# Patient Record
Sex: FEMALE | Race: ASIAN | NOT HISPANIC OR LATINO | Employment: OTHER | URBAN - METROPOLITAN AREA
[De-identification: names, ages, dates, MRNs, and addresses within clinical notes are randomized per-mention and may not be internally consistent; named-entity substitution may affect disease eponyms.]

---

## 2023-04-26 PROBLEM — E78.00 HYPERCHOLESTEROLEMIA: Status: ACTIVE | Noted: 2023-04-26

## 2023-04-26 PROBLEM — R23.8 DUSKY DISCOLORATION OF SKIN: Status: ACTIVE | Noted: 2023-04-26

## 2023-04-26 PROBLEM — M79.10 MYALGIA: Status: ACTIVE | Noted: 2023-04-26

## 2023-04-26 PROBLEM — I73.00 RAYNAUD'S PHENOMENON: Status: ACTIVE | Noted: 2023-04-26

## 2023-04-26 PROBLEM — B02.9 HERPES ZOSTER WITHOUT COMPLICATION: Status: ACTIVE | Noted: 2023-04-26

## 2023-04-26 PROBLEM — M54.12 CERVICAL RADICULAR PAIN: Status: ACTIVE | Noted: 2023-04-26

## 2023-04-26 PROBLEM — R21 RASH OF BODY: Status: ACTIVE | Noted: 2023-04-26

## 2023-04-26 PROBLEM — N39.0 RECURRENT URINARY TRACT INFECTION: Status: ACTIVE | Noted: 2023-04-26

## 2023-04-26 PROBLEM — N84.1 CERVICAL POLYP: Status: ACTIVE | Noted: 2023-04-26

## 2023-04-26 PROBLEM — N89.8 VAGINAL IRRITATION: Status: ACTIVE | Noted: 2023-04-26

## 2023-04-26 PROBLEM — M54.50 LOWER BACK PAIN: Status: ACTIVE | Noted: 2023-04-26

## 2023-04-26 PROBLEM — R07.9 CHEST PAIN: Status: ACTIVE | Noted: 2023-04-26

## 2023-04-26 PROBLEM — R53.83 FATIGUE: Status: ACTIVE | Noted: 2023-04-26

## 2023-04-26 PROBLEM — K92.1 HEMATOCHEZIA: Status: ACTIVE | Noted: 2023-04-26

## 2023-04-26 PROBLEM — S89.90XA LEG INJURY: Status: ACTIVE | Noted: 2023-04-26

## 2023-04-26 PROBLEM — R05.3 CHRONIC COUGH: Status: ACTIVE | Noted: 2023-04-26

## 2023-04-26 PROBLEM — R30.0 DYSURIA: Status: ACTIVE | Noted: 2023-04-26

## 2023-04-26 PROBLEM — E11.9 DIABETES MELLITUS (MULTI): Status: ACTIVE | Noted: 2023-04-26

## 2023-04-26 PROBLEM — E03.9 HYPOTHYROIDISM: Status: ACTIVE | Noted: 2023-04-26

## 2023-04-26 PROBLEM — E55.9 MILD VITAMIN D DEFICIENCY: Status: ACTIVE | Noted: 2023-04-26

## 2023-04-26 PROBLEM — R06.02 SHORTNESS OF BREATH ON EXERTION: Status: ACTIVE | Noted: 2023-04-26

## 2023-04-26 PROBLEM — I20.89 CHRONIC STABLE ANGINA (CMS-HCC): Status: ACTIVE | Noted: 2023-04-26

## 2023-04-26 PROBLEM — I83.93 VARICOSE VEINS OF LEGS: Status: ACTIVE | Noted: 2023-04-26

## 2023-04-26 PROBLEM — M25.519 SHOULDER PAIN: Status: ACTIVE | Noted: 2023-04-26

## 2023-04-26 PROBLEM — M54.41 LOW BACK PAIN WITH RIGHT-SIDED SCIATICA: Status: ACTIVE | Noted: 2023-04-26

## 2023-04-26 RX ORDER — PITAVASTATIN CALCIUM 2.09 MG/1
1 TABLET, FILM COATED ORAL DAILY
COMMUNITY
Start: 2019-10-10 | End: 2023-04-27 | Stop reason: SDUPTHER

## 2023-04-26 RX ORDER — BLOOD SUGAR DIAGNOSTIC
STRIP MISCELLANEOUS
COMMUNITY
Start: 2020-12-21

## 2023-04-26 RX ORDER — METFORMIN HYDROCHLORIDE 1000 MG/1
1 TABLET ORAL 2 TIMES DAILY
COMMUNITY
Start: 2014-10-07 | End: 2023-10-11 | Stop reason: SDUPTHER

## 2023-04-26 RX ORDER — BLOOD-GLUCOSE METER
EACH MISCELLANEOUS
COMMUNITY
Start: 2015-04-13

## 2023-04-26 RX ORDER — LEVOTHYROXINE SODIUM 50 UG/1
1 TABLET ORAL DAILY
COMMUNITY
Start: 2013-03-18 | End: 2023-09-19 | Stop reason: SDUPTHER

## 2023-04-26 RX ORDER — NITROFURANTOIN 25; 75 MG/1; MG/1
1 CAPSULE ORAL 2 TIMES DAILY
COMMUNITY
Start: 2022-08-19 | End: 2023-10-30 | Stop reason: ALTCHOICE

## 2023-04-27 ENCOUNTER — LAB (OUTPATIENT)
Dept: LAB | Facility: LAB | Age: 73
End: 2023-04-27
Payer: MEDICARE

## 2023-04-27 ENCOUNTER — TELEPHONE (OUTPATIENT)
Dept: PRIMARY CARE | Facility: CLINIC | Age: 73
End: 2023-04-27

## 2023-04-27 ENCOUNTER — OFFICE VISIT (OUTPATIENT)
Dept: PRIMARY CARE | Facility: CLINIC | Age: 73
End: 2023-04-27
Payer: MEDICARE

## 2023-04-27 VITALS
SYSTOLIC BLOOD PRESSURE: 122 MMHG | RESPIRATION RATE: 14 BRPM | OXYGEN SATURATION: 99 % | BODY MASS INDEX: 23.18 KG/M2 | HEIGHT: 64 IN | HEART RATE: 57 BPM | WEIGHT: 135.8 LBS | DIASTOLIC BLOOD PRESSURE: 72 MMHG

## 2023-04-27 DIAGNOSIS — E11.9 TYPE 2 DIABETES MELLITUS WITHOUT COMPLICATION, WITHOUT LONG-TERM CURRENT USE OF INSULIN (MULTI): ICD-10-CM

## 2023-04-27 DIAGNOSIS — E55.9 MILD VITAMIN D DEFICIENCY: ICD-10-CM

## 2023-04-27 DIAGNOSIS — Z71.84 TRAVEL ADVICE ENCOUNTER: ICD-10-CM

## 2023-04-27 DIAGNOSIS — E78.00 HYPERCHOLESTEROLEMIA: ICD-10-CM

## 2023-04-27 DIAGNOSIS — Z00.00 MEDICARE ANNUAL WELLNESS VISIT, SUBSEQUENT: ICD-10-CM

## 2023-04-27 DIAGNOSIS — Z00.00 MEDICARE ANNUAL WELLNESS VISIT, SUBSEQUENT: Primary | ICD-10-CM

## 2023-04-27 DIAGNOSIS — Z12.31 ENCOUNTER FOR SCREENING MAMMOGRAM FOR BREAST CANCER: ICD-10-CM

## 2023-04-27 LAB
ALANINE AMINOTRANSFERASE (SGPT) (U/L) IN SER/PLAS: 11 U/L (ref 7–45)
ALBUMIN (G/DL) IN SER/PLAS: 4.2 G/DL (ref 3.4–5)
ALBUMIN (G/DL) IN SER/PLAS: 4.3 G/DL (ref 3.4–5)
ALKALINE PHOSPHATASE (U/L) IN SER/PLAS: 58 U/L (ref 33–136)
ANION GAP IN SER/PLAS: 11 MMOL/L (ref 10–20)
ANION GAP IN SER/PLAS: 11 MMOL/L (ref 10–20)
ASPARTATE AMINOTRANSFERASE (SGOT) (U/L) IN SER/PLAS: 16 U/L (ref 9–39)
BILIRUBIN TOTAL (MG/DL) IN SER/PLAS: 0.4 MG/DL (ref 0–1.2)
CALCIDIOL (25 OH VITAMIN D3) (NG/ML) IN SER/PLAS: 36 NG/ML
CALCIUM (MG/DL) IN SER/PLAS: 9.1 MG/DL (ref 8.6–10.3)
CALCIUM (MG/DL) IN SER/PLAS: 9.2 MG/DL (ref 8.6–10.3)
CARBON DIOXIDE, TOTAL (MMOL/L) IN SER/PLAS: 29 MMOL/L (ref 21–32)
CARBON DIOXIDE, TOTAL (MMOL/L) IN SER/PLAS: 30 MMOL/L (ref 21–32)
CHLORIDE (MMOL/L) IN SER/PLAS: 99 MMOL/L (ref 98–107)
CHLORIDE (MMOL/L) IN SER/PLAS: 99 MMOL/L (ref 98–107)
CHOLESTEROL (MG/DL) IN SER/PLAS: 151 MG/DL (ref 0–199)
CHOLESTEROL (MG/DL) IN SER/PLAS: 160 MG/DL (ref 0–199)
CHOLESTEROL IN HDL (MG/DL) IN SER/PLAS: 63 MG/DL
CHOLESTEROL IN HDL (MG/DL) IN SER/PLAS: 64.1 MG/DL
CHOLESTEROL/HDL RATIO: 2.4
CHOLESTEROL/HDL RATIO: 2.5
COBALAMIN (VITAMIN B12) (PG/ML) IN SER/PLAS: 189 PG/ML (ref 211–911)
CREATININE (MG/DL) IN SER/PLAS: 0.86 MG/DL (ref 0.5–1.05)
CREATININE (MG/DL) IN SER/PLAS: 0.87 MG/DL (ref 0.5–1.05)
ERYTHROCYTE DISTRIBUTION WIDTH (RATIO) BY AUTOMATED COUNT: 14 % (ref 11.5–14.5)
ERYTHROCYTE MEAN CORPUSCULAR HEMOGLOBIN CONCENTRATION (G/DL) BY AUTOMATED: 30.4 G/DL (ref 32–36)
ERYTHROCYTE MEAN CORPUSCULAR VOLUME (FL) BY AUTOMATED COUNT: 83 FL (ref 80–100)
ERYTHROCYTES (10*6/UL) IN BLOOD BY AUTOMATED COUNT: 4.74 X10E12/L (ref 4–5.2)
ESTIMATED AVERAGE GLUCOSE FOR HBA1C: 148 MG/DL
GFR FEMALE: 70 ML/MIN/1.73M2
GFR FEMALE: 71 ML/MIN/1.73M2
GLUCOSE (MG/DL) IN SER/PLAS: 120 MG/DL (ref 74–99)
GLUCOSE (MG/DL) IN SER/PLAS: 122 MG/DL (ref 74–99)
HEMATOCRIT (%) IN BLOOD BY AUTOMATED COUNT: 39.2 % (ref 36–46)
HEMOGLOBIN (G/DL) IN BLOOD: 11.9 G/DL (ref 12–16)
HEMOGLOBIN A1C/HEMOGLOBIN TOTAL IN BLOOD: 6.8 %
HEPATITIS C VIRUS AB PRESENCE IN SERUM: NONREACTIVE
LDL: 73 MG/DL (ref 0–99)
LDL: 80 MG/DL (ref 0–99)
LEUKOCYTES (10*3/UL) IN BLOOD BY AUTOMATED COUNT: 6.6 X10E9/L (ref 4.4–11.3)
PHOSPHATE (MG/DL) IN SER/PLAS: 4.5 MG/DL (ref 2.5–4.9)
PLATELETS (10*3/UL) IN BLOOD AUTOMATED COUNT: 277 X10E9/L (ref 150–450)
POC HEMOGLOBIN A1C: 6.6 % (ref 4.2–6.5)
POTASSIUM (MMOL/L) IN SER/PLAS: 4.3 MMOL/L (ref 3.5–5.3)
POTASSIUM (MMOL/L) IN SER/PLAS: 4.3 MMOL/L (ref 3.5–5.3)
PROTEIN TOTAL: 7.3 G/DL (ref 6.4–8.2)
SODIUM (MMOL/L) IN SER/PLAS: 135 MMOL/L (ref 136–145)
SODIUM (MMOL/L) IN SER/PLAS: 136 MMOL/L (ref 136–145)
THYROTROPIN (MIU/L) IN SER/PLAS BY DETECTION LIMIT <= 0.05 MIU/L: 3.02 MIU/L (ref 0.44–3.98)
THYROTROPIN (MIU/L) IN SER/PLAS BY DETECTION LIMIT <= 0.05 MIU/L: 3.05 MIU/L (ref 0.44–3.98)
THYROXINE (T4) FREE (NG/DL) IN SER/PLAS: 1.15 NG/DL (ref 0.61–1.12)
TRIGLYCERIDE (MG/DL) IN SER/PLAS: 75 MG/DL (ref 0–149)
TRIGLYCERIDE (MG/DL) IN SER/PLAS: 78 MG/DL (ref 0–149)
UREA NITROGEN (MG/DL) IN SER/PLAS: 21 MG/DL (ref 6–23)
UREA NITROGEN (MG/DL) IN SER/PLAS: 21 MG/DL (ref 6–23)
VLDL: 15 MG/DL (ref 0–40)
VLDL: 16 MG/DL (ref 0–40)

## 2023-04-27 PROCEDURE — 1159F MED LIST DOCD IN RCRD: CPT | Performed by: INTERNAL MEDICINE

## 2023-04-27 PROCEDURE — 84443 ASSAY THYROID STIM HORMONE: CPT

## 2023-04-27 PROCEDURE — 86803 HEPATITIS C AB TEST: CPT

## 2023-04-27 PROCEDURE — 85027 COMPLETE CBC AUTOMATED: CPT

## 2023-04-27 PROCEDURE — 83036 HEMOGLOBIN GLYCOSYLATED A1C: CPT | Performed by: INTERNAL MEDICINE

## 2023-04-27 PROCEDURE — 36415 COLL VENOUS BLD VENIPUNCTURE: CPT

## 2023-04-27 PROCEDURE — 80061 LIPID PANEL: CPT

## 2023-04-27 PROCEDURE — 3074F SYST BP LT 130 MM HG: CPT | Performed by: INTERNAL MEDICINE

## 2023-04-27 PROCEDURE — 1036F TOBACCO NON-USER: CPT | Performed by: INTERNAL MEDICINE

## 2023-04-27 PROCEDURE — 82607 VITAMIN B-12: CPT

## 2023-04-27 PROCEDURE — 82306 VITAMIN D 25 HYDROXY: CPT

## 2023-04-27 PROCEDURE — 1170F FXNL STATUS ASSESSED: CPT | Performed by: INTERNAL MEDICINE

## 2023-04-27 PROCEDURE — G0439 PPPS, SUBSEQ VISIT: HCPCS | Performed by: INTERNAL MEDICINE

## 2023-04-27 PROCEDURE — 3078F DIAST BP <80 MM HG: CPT | Performed by: INTERNAL MEDICINE

## 2023-04-27 PROCEDURE — 80053 COMPREHEN METABOLIC PANEL: CPT

## 2023-04-27 PROCEDURE — 3044F HG A1C LEVEL LT 7.0%: CPT | Performed by: INTERNAL MEDICINE

## 2023-04-27 RX ORDER — CIPROFLOXACIN 500 MG/1
500 TABLET ORAL 2 TIMES DAILY
Qty: 14 TABLET | Refills: 0 | Status: SHIPPED | OUTPATIENT
Start: 2023-04-27 | End: 2023-04-27 | Stop reason: SDUPTHER

## 2023-04-27 RX ORDER — PITAVASTATIN CALCIUM 2.09 MG/1
1 TABLET, FILM COATED ORAL DAILY
Qty: 90 TABLET | Refills: 3 | Status: SHIPPED | OUTPATIENT
Start: 2023-04-27 | End: 2024-05-30 | Stop reason: ALTCHOICE

## 2023-04-27 RX ORDER — DIPHENOXYLATE HYDROCHLORIDE AND ATROPINE SULFATE 2.5; .025 MG/1; MG/1
1 TABLET ORAL 4 TIMES DAILY PRN
Qty: 24 TABLET | Refills: 0 | Status: SHIPPED | OUTPATIENT
Start: 2023-04-27 | End: 2023-05-02 | Stop reason: SDUPTHER

## 2023-04-27 RX ORDER — CIPROFLOXACIN 500 MG/1
500 TABLET ORAL 2 TIMES DAILY
Qty: 20 TABLET | Refills: 0 | Status: SHIPPED | OUTPATIENT
Start: 2023-04-27 | End: 2023-05-02 | Stop reason: SDUPTHER

## 2023-04-27 RX ORDER — DIPHENOXYLATE HYDROCHLORIDE AND ATROPINE SULFATE 2.5; .025 MG/1; MG/1
1 TABLET ORAL 4 TIMES DAILY PRN
Qty: 24 TABLET | Refills: 0 | Status: SHIPPED | OUTPATIENT
Start: 2023-04-27 | End: 2023-04-27 | Stop reason: SDUPTHER

## 2023-04-27 ASSESSMENT — ACTIVITIES OF DAILY LIVING (ADL)
DRESSING: INDEPENDENT
GROCERY_SHOPPING: INDEPENDENT
MANAGING_FINANCES: INDEPENDENT
TAKING_MEDICATION: INDEPENDENT
DOING_HOUSEWORK: INDEPENDENT
BATHING: INDEPENDENT

## 2023-04-27 ASSESSMENT — PATIENT HEALTH QUESTIONNAIRE - PHQ9
2. FEELING DOWN, DEPRESSED OR HOPELESS: NOT AT ALL
SUM OF ALL RESPONSES TO PHQ9 QUESTIONS 1 AND 2: 0
1. LITTLE INTEREST OR PLEASURE IN DOING THINGS: NOT AT ALL

## 2023-04-27 NOTE — PATIENT INSTRUCTIONS
FASTING LABS ARE ORDERED FOR YOU    2.  MAMMOGRAM IS ORDERED FOR YOU    3.  SCRIPTS FOR LOMOTIL AND CIPRO ARE ORDERED FOR YOU TO TAKE TO [facility] FOR AS NEEDED FOR INFECTIOUS DIARRHEA    4.  BONE DENSITY RECOMMENDED FOR WHEN YOU COME BACK, AS IS CONSIDERATION FOR SHINGRIX IMMUNIZATION.    5.  PNEUMONIA IMUNIZATION IS RECOMMENDED UPON RETURN FROM TRIP    6.  FOLLOW UP 6 MONTHS

## 2023-04-27 NOTE — TELEPHONE ENCOUNTER
Pt asking for PRINTED scripts of the Cipro and Lomotil.  They are traveling to [place] and would like to have these orders incase they need to be filled.

## 2023-04-27 NOTE — PROGRESS NOTES
"Subjective   Reason for Visit: Sylvia Ramos is an 73 y.o. female here for a Medicare Wellness visit.   WANTS REFERRAL TO HAVE EARS CHECKED BECAUSE OF HER AGE   TRAVELING TO [place] WOULD LIKE RX FOR LOMOTIL AND A ATB     Past Medical, Surgical, and Family History reviewed and updated in chart.         HPI    GOING TO [place] IN [facility], THE [facility].      NOT HAVING A LOT OF TROUBLES  HAS HAD A PROBLEM OF RECURRENT UTI, BUT SINCE HAD A CERVICAL POLYP REMOVAL, NO MORE UIT SINCE LAST NOVEMBER    SOMETIMES HAVE BURNING.    PLANNED TO GET STARTED ON VAGINAL CREAM TO HELP VAGINAL DRYNESS, ETC.      FLU SHOTYES, COVID SHOTS YES, PNEUMOVAX IN 2016, NOT HAD SHINGRIX IMMUNIZATIONS DECLINE FOR NOW RECOMMENDED    COLONOSCOPY IN 2020, HIV NOT INDICATED, BONE DENSITY TEST 2020 NEW ONE RECOMMENDED          Patient Care Team:  Miguel Bruce MD as PCP - General  Miguel Bruce MD as PCP - [facility] Attributed Provider     Review of Systems   Constitutional:  Negative for chills, diaphoresis and fever.   Respiratory:  Negative for cough and shortness of breath.    Cardiovascular:  Negative for chest pain and leg swelling.   Gastrointestinal:  Negative for constipation, diarrhea, nausea and vomiting.   Musculoskeletal:  Negative for joint swelling and myalgias.       Objective   Vitals:  /72   Pulse 57   Resp 14   Ht 1.626 m (5' 4\")   Wt 61.6 kg (135 lb 12.8 oz) Comment: PER PT  SpO2 99%   BMI 23.31 kg/m²       Physical Exam  Vitals reviewed.   Constitutional:       General: She is not in acute distress.     Appearance: She is not ill-appearing.   Cardiovascular:      Rate and Rhythm: Normal rate and regular rhythm.      Pulses: Normal pulses.      Heart sounds:      No gallop.   Pulmonary:      Breath sounds: Normal breath sounds. No wheezing, rhonchi or rales.   Abdominal:      General: Abdomen is flat. Bowel sounds are normal.      Palpations: Abdomen is soft.      Tenderness: There is no " guarding or rebound.   Musculoskeletal:      Right lower leg: No edema.      Left lower leg: No edema.         Assessment/Plan   Problem List Items Addressed This Visit          Endocrine/Metabolic    Diabetes mellitus (CMS/HCC)    Relevant Orders    POCT glycosylated hemoglobin (Hb A1C) manually resulted    Mild vitamin D deficiency    Relevant Orders    Vitamin D, Total (Completed)       Other    Hypercholesterolemia    Relevant Medications    pitavastatin calcium (Livalo) 2 mg tablet    Other Relevant Orders    CBC (Completed)    Comprehensive Metabolic Panel (Completed)    TSH with reflex to Free T4 if abnormal (Completed)    Vitamin B12 (Completed)    Lipid Panel (Completed)     Other Visit Diagnoses       Medicare annual wellness visit, subsequent    -  Primary    Relevant Orders    Hepatitis C Antibody (Completed)    Travel advice encounter        Encounter for screening mammogram for breast cancer        Relevant Orders    BI mammo bilateral screening tomosynthesis          Patient Instructions    FASTING LABS ARE ORDERED FOR YOU    2.  MAMMOGRAM IS ORDERED FOR YOU    3.  SCRIPTS FOR LOMOTIL AND CIPRO ARE ORDERED FOR YOU TO TAKE TO [facility] FOR AS NEEDED FOR INFECTIOUS DIARRHEA    4.  BONE DENSITY RECOMMENDED FOR WHEN YOU COME BACK, AS IS CONSIDERATION FOR SHINGRIX IMMUNIZATION.    5.  PNEUMONIA IMUNIZATION IS RECOMMENDED UPON RETURN FROM TRIP    6.  FOLLOW UP 6 MONTHS

## 2023-04-28 ENCOUNTER — TELEPHONE (OUTPATIENT)
Dept: PRIMARY CARE | Facility: CLINIC | Age: 73
End: 2023-04-28
Payer: MEDICARE

## 2023-04-28 NOTE — TELEPHONE ENCOUNTER
Please send the scripts for Patient and her  that were printed.  They have decided to fill locally, but the scripts will need to be sent electronically to   [facility].

## 2023-05-01 ASSESSMENT — ENCOUNTER SYMPTOMS
DIAPHORESIS: 0
DIARRHEA: 0
SHORTNESS OF BREATH: 0
JOINT SWELLING: 0
CHILLS: 0
COUGH: 0
VOMITING: 0
CONSTIPATION: 0
MYALGIAS: 0
FEVER: 0
NAUSEA: 0

## 2023-05-02 RX ORDER — CIPROFLOXACIN 500 MG/1
500 TABLET ORAL 2 TIMES DAILY
Qty: 20 TABLET | Refills: 0 | Status: SHIPPED | OUTPATIENT
Start: 2023-05-02 | End: 2023-05-12

## 2023-05-02 RX ORDER — DIPHENOXYLATE HYDROCHLORIDE AND ATROPINE SULFATE 2.5; .025 MG/1; MG/1
1 TABLET ORAL 4 TIMES DAILY PRN
Qty: 24 TABLET | Refills: 0 | Status: SHIPPED | OUTPATIENT
Start: 2023-05-02 | End: 2023-05-08

## 2023-09-18 ENCOUNTER — TELEPHONE (OUTPATIENT)
Dept: PRIMARY CARE | Facility: CLINIC | Age: 73
End: 2023-09-18
Payer: MEDICARE

## 2023-09-18 DIAGNOSIS — E03.9 HYPOTHYROIDISM, UNSPECIFIED TYPE: ICD-10-CM

## 2023-09-19 DIAGNOSIS — E03.9 HYPOTHYROIDISM, UNSPECIFIED TYPE: ICD-10-CM

## 2023-09-19 RX ORDER — LEVOTHYROXINE SODIUM 50 UG/1
50 TABLET ORAL DAILY
Qty: 90 TABLET | Refills: 1 | Status: SHIPPED | OUTPATIENT
Start: 2023-09-19 | End: 2023-10-28 | Stop reason: SDUPTHER

## 2023-09-19 RX ORDER — LEVOTHYROXINE SODIUM 50 UG/1
50 TABLET ORAL DAILY
Qty: 90 TABLET | Refills: 3 | Status: SHIPPED | OUTPATIENT
Start: 2023-09-19

## 2023-10-10 ENCOUNTER — TELEPHONE (OUTPATIENT)
Dept: PRIMARY CARE | Facility: CLINIC | Age: 73
End: 2023-10-10
Payer: MEDICARE

## 2023-10-11 DIAGNOSIS — E11.9 CONTROLLED TYPE 2 DIABETES MELLITUS WITHOUT COMPLICATION, WITHOUT LONG-TERM CURRENT USE OF INSULIN (MULTI): ICD-10-CM

## 2023-10-11 RX ORDER — METFORMIN HYDROCHLORIDE 1000 MG/1
1000 TABLET ORAL 2 TIMES DAILY
Qty: 180 TABLET | Refills: 0 | Status: SHIPPED | OUTPATIENT
Start: 2023-10-11 | End: 2024-01-11 | Stop reason: SDUPTHER

## 2023-10-24 ENCOUNTER — TELEPHONE (OUTPATIENT)
Dept: CARDIOLOGY | Facility: CLINIC | Age: 73
End: 2023-10-24
Payer: MEDICARE

## 2023-10-26 ENCOUNTER — APPOINTMENT (OUTPATIENT)
Dept: CARDIOLOGY | Facility: CLINIC | Age: 73
End: 2023-10-26
Payer: MEDICARE

## 2023-10-28 DIAGNOSIS — E53.8 VITAMIN B12 DEFICIENCY: Primary | ICD-10-CM

## 2023-10-28 PROBLEM — H01.02B SQUAMOUS BLEPHARITIS OF UPPER AND LOWER EYELIDS OF BOTH EYES: Status: ACTIVE | Noted: 2018-01-30

## 2023-10-28 PROBLEM — H25.013 CORTICAL SENILE CATARACT OF BOTH EYES: Status: ACTIVE | Noted: 2018-01-30

## 2023-10-28 PROBLEM — M54.50 LOWER BACK PAIN: Status: RESOLVED | Noted: 2023-04-26 | Resolved: 2023-10-28

## 2023-10-28 PROBLEM — D22.5 MELANOCYTIC NEVI OF TRUNK: Status: ACTIVE | Noted: 2019-12-09

## 2023-10-28 PROBLEM — B00.9 HERPESVIRAL INFECTION, UNSPECIFIED: Status: ACTIVE | Noted: 2019-12-09

## 2023-10-28 PROBLEM — G89.29 CHRONIC LEFT SHOULDER PAIN: Status: ACTIVE | Noted: 2018-02-06

## 2023-10-28 PROBLEM — H43.393 VITREOUS SYNERESIS OF BOTH EYES: Status: ACTIVE | Noted: 2018-01-30

## 2023-10-28 PROBLEM — E03.9 HYPOTHYROID: Status: ACTIVE | Noted: 2023-10-28

## 2023-10-28 PROBLEM — M25.512 CHRONIC LEFT SHOULDER PAIN: Status: ACTIVE | Noted: 2018-02-06

## 2023-10-28 PROBLEM — H01.02A SQUAMOUS BLEPHARITIS OF UPPER AND LOWER EYELIDS OF BOTH EYES: Status: ACTIVE | Noted: 2018-01-30

## 2023-10-28 PROBLEM — H52.7 REFRACTION ERROR: Status: ACTIVE | Noted: 2018-01-30

## 2023-10-28 PROBLEM — L91.8 OTHER HYPERTROPHIC DISORDERS OF THE SKIN: Status: ACTIVE | Noted: 2019-12-09

## 2023-10-28 PROBLEM — N95.8 GENITOURINARY SYNDROME OF MENOPAUSE: Status: ACTIVE | Noted: 2023-10-28

## 2023-10-28 PROBLEM — D18.01 HEMANGIOMA OF SKIN AND SUBCUTANEOUS TISSUE: Status: ACTIVE | Noted: 2019-12-09

## 2023-10-28 RX ORDER — SULFAMETHOXAZOLE AND TRIMETHOPRIM 800; 160 MG/1; MG/1
1 TABLET ORAL 2 TIMES DAILY
COMMUNITY
End: 2023-10-30 | Stop reason: ALTCHOICE

## 2023-10-28 RX ORDER — CALCIUM CARBONATE 200(500)MG
1 TABLET,CHEWABLE ORAL 2 TIMES DAILY
COMMUNITY
Start: 2012-05-21 | End: 2023-10-30 | Stop reason: ALTCHOICE

## 2023-10-28 RX ORDER — VIT C/E/ZN/COPPR/LUTEIN/ZEAXAN 250MG-90MG
1 CAPSULE ORAL 2 TIMES DAILY
COMMUNITY

## 2023-10-28 RX ORDER — MULTIVITAMIN
1 TABLET ORAL
COMMUNITY
Start: 2012-05-21

## 2023-10-28 RX ORDER — VALACYCLOVIR HYDROCHLORIDE 1 G/1
1 TABLET, FILM COATED ORAL
COMMUNITY
Start: 2019-12-09 | End: 2023-10-30 | Stop reason: ALTCHOICE

## 2023-10-28 RX ORDER — PREDNISOLONE ACETATE 10 MG/ML
1 SUSPENSION/ DROPS OPHTHALMIC
COMMUNITY

## 2023-10-30 ENCOUNTER — OFFICE VISIT (OUTPATIENT)
Dept: PRIMARY CARE | Facility: CLINIC | Age: 73
End: 2023-10-30
Payer: MEDICARE

## 2023-10-30 ENCOUNTER — LAB (OUTPATIENT)
Dept: LAB | Facility: LAB | Age: 73
End: 2023-10-30
Payer: MEDICARE

## 2023-10-30 VITALS
RESPIRATION RATE: 14 BRPM | SYSTOLIC BLOOD PRESSURE: 122 MMHG | WEIGHT: 139 LBS | OXYGEN SATURATION: 100 % | HEART RATE: 67 BPM | HEIGHT: 64 IN | DIASTOLIC BLOOD PRESSURE: 78 MMHG | BODY MASS INDEX: 23.73 KG/M2

## 2023-10-30 DIAGNOSIS — E11.9 TYPE 2 DIABETES MELLITUS WITHOUT COMPLICATION, WITHOUT LONG-TERM CURRENT USE OF INSULIN (MULTI): ICD-10-CM

## 2023-10-30 DIAGNOSIS — E53.8 VITAMIN B12 DEFICIENCY: ICD-10-CM

## 2023-10-30 DIAGNOSIS — E11.9 TYPE 2 DIABETES MELLITUS WITHOUT COMPLICATION, WITHOUT LONG-TERM CURRENT USE OF INSULIN (MULTI): Primary | ICD-10-CM

## 2023-10-30 DIAGNOSIS — E03.9 ACQUIRED HYPOTHYROIDISM: ICD-10-CM

## 2023-10-30 DIAGNOSIS — D64.9 ANEMIA, UNSPECIFIED TYPE: ICD-10-CM

## 2023-10-30 DIAGNOSIS — Z00.00 PREVENTATIVE HEALTH CARE: ICD-10-CM

## 2023-10-30 LAB
ALBUMIN SERPL BCP-MCNC: 4.1 G/DL (ref 3.4–5)
ALP SERPL-CCNC: 58 U/L (ref 33–136)
ALT SERPL W P-5'-P-CCNC: 11 U/L (ref 7–45)
ANION GAP SERPL CALC-SCNC: 12 MMOL/L (ref 10–20)
AST SERPL W P-5'-P-CCNC: 17 U/L (ref 9–39)
BASOPHILS # BLD AUTO: 0.02 X10*3/UL (ref 0–0.1)
BASOPHILS NFR BLD AUTO: 0.4 %
BILIRUB SERPL-MCNC: 0.5 MG/DL (ref 0–1.2)
BUN SERPL-MCNC: 15 MG/DL (ref 6–23)
CALCIUM SERPL-MCNC: 8.9 MG/DL (ref 8.6–10.3)
CHLORIDE SERPL-SCNC: 100 MMOL/L (ref 98–107)
CO2 SERPL-SCNC: 30 MMOL/L (ref 21–32)
CREAT SERPL-MCNC: 0.77 MG/DL (ref 0.5–1.05)
EOSINOPHIL # BLD AUTO: 0.06 X10*3/UL (ref 0–0.4)
EOSINOPHIL NFR BLD AUTO: 1.1 %
ERYTHROCYTE [DISTWIDTH] IN BLOOD BY AUTOMATED COUNT: 14.6 % (ref 11.5–14.5)
EST. AVERAGE GLUCOSE BLD GHB EST-MCNC: 148 MG/DL
GFR SERPL CREATININE-BSD FRML MDRD: 82 ML/MIN/1.73M*2
GLUCOSE SERPL-MCNC: 119 MG/DL (ref 74–99)
HBA1C MFR BLD: 6.8 %
HCT VFR BLD AUTO: 39.1 % (ref 36–46)
HGB BLD-MCNC: 12 G/DL (ref 12–16)
IMM GRANULOCYTES # BLD AUTO: 0.01 X10*3/UL (ref 0–0.5)
IMM GRANULOCYTES NFR BLD AUTO: 0.2 % (ref 0–0.9)
IRON SATN MFR SERPL: 30 % (ref 25–45)
IRON SERPL-MCNC: 121 UG/DL (ref 35–150)
LYMPHOCYTES # BLD AUTO: 1.6 X10*3/UL (ref 0.8–3)
LYMPHOCYTES NFR BLD AUTO: 28.8 %
MCH RBC QN AUTO: 25.4 PG (ref 26–34)
MCHC RBC AUTO-ENTMCNC: 30.7 G/DL (ref 32–36)
MCV RBC AUTO: 83 FL (ref 80–100)
MONOCYTES # BLD AUTO: 0.46 X10*3/UL (ref 0.05–0.8)
MONOCYTES NFR BLD AUTO: 8.3 %
NEUTROPHILS # BLD AUTO: 3.41 X10*3/UL (ref 1.6–5.5)
NEUTROPHILS NFR BLD AUTO: 61.2 %
NRBC BLD-RTO: 0 /100 WBCS (ref 0–0)
PLATELET # BLD AUTO: 280 X10*3/UL (ref 150–450)
PMV BLD AUTO: 10.6 FL (ref 7.5–11.5)
POTASSIUM SERPL-SCNC: 4.9 MMOL/L (ref 3.5–5.3)
PROT SERPL-MCNC: 7.1 G/DL (ref 6.4–8.2)
RBC # BLD AUTO: 4.72 X10*6/UL (ref 4–5.2)
SODIUM SERPL-SCNC: 137 MMOL/L (ref 136–145)
TIBC SERPL-MCNC: 398 UG/DL (ref 240–445)
TSH SERPL-ACNC: 2.53 MIU/L (ref 0.44–3.98)
UIBC SERPL-MCNC: 277 UG/DL (ref 110–370)
VIT B12 SERPL-MCNC: 132 PG/ML (ref 211–911)
WBC # BLD AUTO: 5.6 X10*3/UL (ref 4.4–11.3)

## 2023-10-30 PROCEDURE — 82728 ASSAY OF FERRITIN: CPT

## 2023-10-30 PROCEDURE — 84443 ASSAY THYROID STIM HORMONE: CPT

## 2023-10-30 PROCEDURE — 1159F MED LIST DOCD IN RCRD: CPT | Performed by: INTERNAL MEDICINE

## 2023-10-30 PROCEDURE — 82607 VITAMIN B-12: CPT

## 2023-10-30 PROCEDURE — 3078F DIAST BP <80 MM HG: CPT | Performed by: INTERNAL MEDICINE

## 2023-10-30 PROCEDURE — 90677 PCV20 VACCINE IM: CPT | Performed by: INTERNAL MEDICINE

## 2023-10-30 PROCEDURE — 1036F TOBACCO NON-USER: CPT | Performed by: INTERNAL MEDICINE

## 2023-10-30 PROCEDURE — 36415 COLL VENOUS BLD VENIPUNCTURE: CPT

## 2023-10-30 PROCEDURE — 3074F SYST BP LT 130 MM HG: CPT | Performed by: INTERNAL MEDICINE

## 2023-10-30 PROCEDURE — 83036 HEMOGLOBIN GLYCOSYLATED A1C: CPT

## 2023-10-30 PROCEDURE — 83550 IRON BINDING TEST: CPT

## 2023-10-30 PROCEDURE — 3044F HG A1C LEVEL LT 7.0%: CPT | Performed by: INTERNAL MEDICINE

## 2023-10-30 PROCEDURE — G0009 ADMIN PNEUMOCOCCAL VACCINE: HCPCS | Performed by: INTERNAL MEDICINE

## 2023-10-30 PROCEDURE — 83540 ASSAY OF IRON: CPT

## 2023-10-30 PROCEDURE — 1126F AMNT PAIN NOTED NONE PRSNT: CPT | Performed by: INTERNAL MEDICINE

## 2023-10-30 PROCEDURE — 99213 OFFICE O/P EST LOW 20 MIN: CPT | Performed by: INTERNAL MEDICINE

## 2023-10-30 PROCEDURE — G0008 ADMIN INFLUENZA VIRUS VAC: HCPCS | Performed by: INTERNAL MEDICINE

## 2023-10-30 PROCEDURE — 85025 COMPLETE CBC W/AUTO DIFF WBC: CPT

## 2023-10-30 PROCEDURE — 90662 IIV NO PRSV INCREASED AG IM: CPT | Performed by: INTERNAL MEDICINE

## 2023-10-30 PROCEDURE — 80053 COMPREHEN METABOLIC PANEL: CPT

## 2023-10-30 ASSESSMENT — PATIENT HEALTH QUESTIONNAIRE - PHQ9
2. FEELING DOWN, DEPRESSED OR HOPELESS: NOT AT ALL
1. LITTLE INTEREST OR PLEASURE IN DOING THINGS: NOT AT ALL
SUM OF ALL RESPONSES TO PHQ9 QUESTIONS 1 AND 2: 0

## 2023-10-30 NOTE — PATIENT INSTRUCTIONS
FLU AND PREVNAR-20 IMMUNIZATIONS ARE ADMINISTERED TO YOU TODAY    2.  LABS ARE ORDERED TO INCLUDE THE TESTS WE DISCUSSED    3.  CONTINUE PRESENT MEDS    4.  IF B12 LEVEL REMAINS LOW, I'LL ASK YOU TO START A B12 SUPPLEMENT    5.  IRON LEVELS WILL BE CHECKED DUE TO MINIMAL ANEMIA    6.  CONTINUE DIET/EXERCISE/HEALTHY WEIGHT    7.  6 MONTHS FOLLOW UP OR AS NEEDED

## 2023-10-30 NOTE — PROGRESS NOTES
"Subjective   Sylvia Ramos is a 73 y.o. female who presents for No chief complaint on file..   PREFERS TO HAVE A1C CHECKED WITH LABS    WOULD LIKE FLU SHOT AND PNEUMO IF NEEDED    HAD CATARACT SURGERY B/L EYES IN SEPT PER PT HAD VERY HIGH BP TOOK 1 MONTH TO GO BACK TO NORMAL     HPI   NO NEW COMPLAINTS    WOULD LIKE BLOOD TESTING    WALK REGULARLY FOR EXERCISE WITH SPOUSE      Review of Systems   Constitutional:  Negative for chills, diaphoresis and fever.   Respiratory:  Negative for cough and shortness of breath.    Cardiovascular:  Negative for chest pain and leg swelling.   Gastrointestinal:  Negative for constipation, diarrhea, nausea and vomiting.   Musculoskeletal:  Negative for joint swelling and myalgias.       Objective   /78   Pulse 67   Resp 14   Ht 1.626 m (5' 4\")   Wt 63 kg (139 lb)   SpO2 100%   BMI 23.86 kg/m²     Physical Exam  Vitals reviewed.   Constitutional:       General: She is not in acute distress.     Appearance: She is not ill-appearing.   Cardiovascular:      Rate and Rhythm: Normal rate and regular rhythm.      Pulses: Normal pulses.      Heart sounds:      No gallop.   Pulmonary:      Breath sounds: Normal breath sounds. No wheezing, rhonchi or rales.   Abdominal:      General: Abdomen is flat. Bowel sounds are normal.      Palpations: Abdomen is soft.      Tenderness: There is no guarding or rebound.   Musculoskeletal:      Right lower leg: No edema.      Left lower leg: No edema.         Assessment/Plan   Problem List Items Addressed This Visit       Diabetes mellitus (CMS/HCC) - Primary    Relevant Orders    Comprehensive Metabolic Panel (Completed)    Hemoglobin A1C (Completed)    Hypothyroidism    Relevant Orders    TSH with reflex to Free T4 if abnormal (Completed)     Other Visit Diagnoses       Preventative health care        Relevant Orders    Pneumococcal conjugate vaccine, 20-valent (PREVNAR 20) (Completed)    Flu vaccine, quadrivalent, high-dose, preservative " free, age 65y+ (FLUZONE) (Completed)    Vitamin B12 deficiency        Relevant Orders    Vitamin B12 (Completed)    Anemia, unspecified type        Relevant Orders    CBC and Auto Differential (Completed)    Iron and TIBC (Completed)    Ferritin (Completed)          Patient Instructions    FLU AND PREVNAR-20 IMMUNIZATIONS ARE ADMINISTERED TO YOU TODAY    2.  LABS ARE ORDERED TO INCLUDE THE TESTS WE DISCUSSED    3.  CONTINUE PRESENT MEDS    4.  IF B12 LEVEL REMAINS LOW, I'LL ASK YOU TO START A B12 SUPPLEMENT    5.  IRON LEVELS WILL BE CHECKED DUE TO MINIMAL ANEMIA    6.  CONTINUE DIET/EXERCISE/HEALTHY WEIGHT    7.  6 MONTHS FOLLOW UP OR AS NEEDED

## 2023-10-31 LAB — FERRITIN SERPL-MCNC: 17 NG/ML (ref 8–150)

## 2023-11-01 ASSESSMENT — ENCOUNTER SYMPTOMS
MYALGIAS: 0
SHORTNESS OF BREATH: 0
COUGH: 0
FEVER: 0
VOMITING: 0
DIARRHEA: 0
CONSTIPATION: 0
JOINT SWELLING: 0
CHILLS: 0
NAUSEA: 0
DIAPHORESIS: 0

## 2023-11-13 ENCOUNTER — OFFICE VISIT (OUTPATIENT)
Dept: ENDOCRINOLOGY | Facility: HOSPITAL | Age: 73
End: 2023-11-13
Payer: MEDICARE

## 2023-11-13 VITALS
SYSTOLIC BLOOD PRESSURE: 163 MMHG | DIASTOLIC BLOOD PRESSURE: 88 MMHG | HEIGHT: 64 IN | BODY MASS INDEX: 24.19 KG/M2 | OXYGEN SATURATION: 99 % | TEMPERATURE: 96.6 F | WEIGHT: 141.7 LBS | HEART RATE: 72 BPM

## 2023-11-13 DIAGNOSIS — E11.9 TYPE 2 DIABETES MELLITUS WITHOUT COMPLICATION, WITHOUT LONG-TERM CURRENT USE OF INSULIN (MULTI): ICD-10-CM

## 2023-11-13 DIAGNOSIS — E03.9 HYPOTHYROIDISM, UNSPECIFIED TYPE: Primary | ICD-10-CM

## 2023-11-13 LAB — GLUCOSE BLD MANUAL STRIP-MCNC: 125 MG/DL (ref 74–99)

## 2023-11-13 PROCEDURE — 82947 ASSAY GLUCOSE BLOOD QUANT: CPT | Performed by: STUDENT IN AN ORGANIZED HEALTH CARE EDUCATION/TRAINING PROGRAM

## 2023-11-13 PROCEDURE — 1159F MED LIST DOCD IN RCRD: CPT | Performed by: STUDENT IN AN ORGANIZED HEALTH CARE EDUCATION/TRAINING PROGRAM

## 2023-11-13 PROCEDURE — 99215 OFFICE O/P EST HI 40 MIN: CPT | Performed by: STUDENT IN AN ORGANIZED HEALTH CARE EDUCATION/TRAINING PROGRAM

## 2023-11-13 PROCEDURE — 36416 COLLJ CAPILLARY BLOOD SPEC: CPT | Performed by: STUDENT IN AN ORGANIZED HEALTH CARE EDUCATION/TRAINING PROGRAM

## 2023-11-13 PROCEDURE — 3077F SYST BP >= 140 MM HG: CPT | Performed by: STUDENT IN AN ORGANIZED HEALTH CARE EDUCATION/TRAINING PROGRAM

## 2023-11-13 PROCEDURE — 3044F HG A1C LEVEL LT 7.0%: CPT | Performed by: STUDENT IN AN ORGANIZED HEALTH CARE EDUCATION/TRAINING PROGRAM

## 2023-11-13 PROCEDURE — 1036F TOBACCO NON-USER: CPT | Performed by: STUDENT IN AN ORGANIZED HEALTH CARE EDUCATION/TRAINING PROGRAM

## 2023-11-13 PROCEDURE — 3079F DIAST BP 80-89 MM HG: CPT | Performed by: STUDENT IN AN ORGANIZED HEALTH CARE EDUCATION/TRAINING PROGRAM

## 2023-11-13 PROCEDURE — 1126F AMNT PAIN NOTED NONE PRSNT: CPT | Performed by: STUDENT IN AN ORGANIZED HEALTH CARE EDUCATION/TRAINING PROGRAM

## 2023-11-13 ASSESSMENT — COLUMBIA-SUICIDE SEVERITY RATING SCALE - C-SSRS
6. HAVE YOU EVER DONE ANYTHING, STARTED TO DO ANYTHING, OR PREPARED TO DO ANYTHING TO END YOUR LIFE?: NO
1. IN THE PAST MONTH, HAVE YOU WISHED YOU WERE DEAD OR WISHED YOU COULD GO TO SLEEP AND NOT WAKE UP?: NO
2. HAVE YOU ACTUALLY HAD ANY THOUGHTS OF KILLING YOURSELF?: NO

## 2023-11-13 ASSESSMENT — ENCOUNTER SYMPTOMS
DEPRESSION: 0
LOSS OF SENSATION IN FEET: 0
OCCASIONAL FEELINGS OF UNSTEADINESS: 0

## 2023-11-13 ASSESSMENT — PATIENT HEALTH QUESTIONNAIRE - PHQ9
SUM OF ALL RESPONSES TO PHQ9 QUESTIONS 1 AND 2: 0
1. LITTLE INTEREST OR PLEASURE IN DOING THINGS: NOT AT ALL
2. FEELING DOWN, DEPRESSED OR HOPELESS: NOT AT ALL

## 2023-11-13 NOTE — PROGRESS NOTES
Patient coming in for follow up for T2DM    Subjective   Sylvia Ramos is a 73 y.o. female who presents for follow up for Type 2 diabetes mellitus and hypothyroidism  Lab Results   Component Value Date    HGBA1C 6.8 (H) 10/30/2023      Lab Results   Component Value Date    TSH 2.53 10/30/2023    ` date of diagnosis: 8-10 years. Date of last HbA1c: August 2022 and results: 7.1%.   Interval History: Metformin 1000 mg BID in the morning and bed time and Livalo 2 mg  Levothyroxine 50 mg daily takes it appropriately   Taking vitamin B 12 sublingual 1000 mcg yesterday  Fatigued and has been having shortness of breath and tachycardia.    Home Glucose Monitoring:   Glucose Ranges: In am: 100-110 if eats late 120-130s  No recent hypoglycemic episodes.   Diet Plan:   Eats small meals frequently.  Breakfast big meal with protein. Soup with a salad and then tea with a cookie. Dinner between 6-8  If she feels lightheaded before bed has a peanut butter cracker. When she eats dinner early  Diabetes Surveillance: Eye exam: October. Had cataract in September   Diabetes Complications:   Opthalmic: Has not been found to have any eye complications   Cardiovascular: no coronary artery bypass graft and no coronary artery disease . On Livazo.   Renal: no nephropathy and no end stage renal disease.   Neurologic: peripheral neuropathy . Sometimes once in a while when sugar   Current symptoms include extremity numbness and Nocturia, Improvement in UTI but no abdominal pain, no constipation, no diarrhea, no extremity pain, no nausea, no polydipsia, no polyuria, no change in vision, no weight gain and no weight loss   Last UTI a year ago  Hypothyroid hx in her 50s.   On LT4 50 mcg daily takes appropriately  Reports hair thinning and cold intolerance    Review of Systems  all pertinent systems reviewed and are otherwise negative   Objective   Visit Vitals  /88 (BP Location: Right arm, Patient Position: Sitting, BP Cuff Size: Adult)   Pulse  "72   Temp 35.9 °C (96.6 °F) (Temporal)   Ht 1.626 m (5' 4\")   Wt 64.3 kg (141 lb 11.2 oz)   LMP  (LMP Unknown)   SpO2 99%   BMI 24.32 kg/m²   OB Status Unknown   Smoking Status Never   BSA 1.7 m²      Physical Exam  Constitutional:       General: She is not in acute distress.     Appearance: Normal appearance.   Eyes:      Extraocular Movements: Extraocular movements intact.      Pupils: Pupils are equal, round, and reactive to light.   Cardiovascular:      Rate and Rhythm: Normal rate and regular rhythm.   Pulmonary:      Effort: Pulmonary effort is normal. No respiratory distress.      Breath sounds: Normal breath sounds.   Abdominal:      General: Bowel sounds are normal.      Palpations: Abdomen is soft.      Tenderness: There is no abdominal tenderness.   Skin:     Coloration: Skin is not jaundiced or pale.      Findings: No erythema or rash.   Neurological:      General: No focal deficit present.      Mental Status: She is alert and oriented to person, place, and time.      Deep Tendon Reflexes: Reflexes normal.   Psychiatric:         Mood and Affect: Mood normal.         Behavior: Behavior normal.         Lab Review  Glucose (mg/dL)   Date Value   10/30/2023 119 (H)   04/27/2023 120 (H)   04/27/2023 122 (H)   08/29/2022 123 (H)     TR HGBA1C (Data Conversion) (%)   Date Value   05/07/2018 7.4 (H)   06/02/2017 7.5 (H)     POC HEMOGLOBIN A1c (%)   Date Value   04/27/2023 6.6 (A)     Hemoglobin A1C (%)   Date Value   10/30/2023 6.8 (H)   04/27/2023 6.8 (A)   08/29/2022 7.1 (A)   04/25/2022 6.9 (A)     Bicarbonate (mmol/L)   Date Value   10/30/2023 30   04/27/2023 29   04/27/2023 30   08/29/2022 28     Urea Nitrogen (mg/dL)   Date Value   10/30/2023 15   04/27/2023 21   04/27/2023 21   08/29/2022 15     Creatinine (mg/dL)   Date Value   10/30/2023 0.77   04/27/2023 0.87   04/27/2023 0.86   08/29/2022 0.83     Lab Results   Component Value Date    CHOL 151 04/27/2023    CHOL 160 04/27/2023    CHOL 158 04/25/2022 " "    Lab Results   Component Value Date    HDL 63.0 04/27/2023    HDL 64.1 04/27/2023    HDL 62.0 04/25/2022     No results found for: \"LDLCALC\"  Lab Results   Component Value Date    TRIG 75 04/27/2023    TRIG 78 04/27/2023    TRIG 87 04/25/2022     No components found for: \"CHOLHDL\"   Lab Results   Component Value Date    TSH 2.53 10/30/2023     Assessment/Plan   Mrs. Ramos is a 73 year old F with T2DM and hypothyroidism coming in for follow up.  date of diagnosis: 8-10 years. Date of last HbA1c: Oct 2023 and results: 6.8%.   Metformin 1000 mg BID in the morning and bed time and Livalo 2 mg  Levothyroxine 50 mg daily takes it appropriately   Taking vitamin B 12 sublingual 1000 mcg yesterday  Fatigued and has been having shortness of breath and tachycardia.  Glucose Ranges: In am: 100-110 if eats late 120-130s  No recent hypoglycemic episodes.   Diabetes Surveillance: Eye exam: October. Had cataract in September   Diabetes Complications:   Opthalmic: Has not been found to have any eye complications   Cardiovascular: no coronary artery bypass graft and no coronary artery disease . On Livazo.   Renal: no nephropathy and no end stage renal disease.   Neurologic: peripheral neuropathy . Sometimes once in a while when sugar    Last UTI a year ago  Hypothyroid hx in her 50s.   On LT4 50 mcg daily takes appropriately  Reports hair thinning and cold intolerance    Plan:  Continue Metformin 1000 mg BID with meals  Continue livalo  Continue to follow with ophthalmology  Check BG 2-3 times weekly  When lightheaded check BG  Continue levothyroxine 50 mcg daily  to be taken on an empty stomach on its own 30min before other meds or food     RTC in 1 year or sooner if needed     "

## 2023-11-13 NOTE — PATIENT INSTRUCTIONS
Continue Metformin 1000 mg BID with meals  Continue livalo  Continue to follow with ophthalmology  Check BG 2-3 times weekly  When lightheaded check BG    Continue levothyroxine 50 mcg daily  to be taken on an empty stomach on its own 30min before other meds or food     RTC in 1 year or sooner if needed

## 2023-12-22 ENCOUNTER — LAB (OUTPATIENT)
Dept: LAB | Facility: LAB | Age: 73
End: 2023-12-22
Payer: MEDICARE

## 2023-12-22 DIAGNOSIS — D64.9 ANEMIA, UNSPECIFIED TYPE: ICD-10-CM

## 2023-12-22 DIAGNOSIS — E53.8 VITAMIN B12 DEFICIENCY: ICD-10-CM

## 2023-12-22 LAB
ERYTHROCYTE [DISTWIDTH] IN BLOOD BY AUTOMATED COUNT: 13.9 % (ref 11.5–14.5)
HCT VFR BLD AUTO: 39.4 % (ref 36–46)
HGB BLD-MCNC: 11.8 G/DL (ref 12–16)
MCH RBC QN AUTO: 24.9 PG (ref 26–34)
MCHC RBC AUTO-ENTMCNC: 29.9 G/DL (ref 32–36)
MCV RBC AUTO: 83 FL (ref 80–100)
NRBC BLD-RTO: 0 /100 WBCS (ref 0–0)
PLATELET # BLD AUTO: 287 X10*3/UL (ref 150–450)
RBC # BLD AUTO: 4.73 X10*6/UL (ref 4–5.2)
VIT B12 SERPL-MCNC: 425 PG/ML (ref 211–911)
WBC # BLD AUTO: 7.2 X10*3/UL (ref 4.4–11.3)

## 2023-12-22 PROCEDURE — 82607 VITAMIN B-12: CPT

## 2023-12-22 PROCEDURE — 83550 IRON BINDING TEST: CPT

## 2023-12-22 PROCEDURE — 83540 ASSAY OF IRON: CPT

## 2023-12-22 PROCEDURE — 85027 COMPLETE CBC AUTOMATED: CPT

## 2023-12-22 PROCEDURE — 36415 COLL VENOUS BLD VENIPUNCTURE: CPT

## 2023-12-22 PROCEDURE — 82728 ASSAY OF FERRITIN: CPT

## 2023-12-26 DIAGNOSIS — D64.9 ANEMIA, UNSPECIFIED TYPE: Primary | ICD-10-CM

## 2023-12-26 DIAGNOSIS — D50.9 IRON DEFICIENCY ANEMIA, UNSPECIFIED IRON DEFICIENCY ANEMIA TYPE: ICD-10-CM

## 2023-12-26 LAB
FERRITIN SERPL-MCNC: 19 NG/ML (ref 8–150)
IRON SATN MFR SERPL: 14 % (ref 25–45)
IRON SERPL-MCNC: 63 UG/DL (ref 35–150)
TIBC SERPL-MCNC: 463 UG/DL (ref 240–445)
UIBC SERPL-MCNC: 400 UG/DL (ref 110–370)

## 2024-01-11 DIAGNOSIS — E11.9 CONTROLLED TYPE 2 DIABETES MELLITUS WITHOUT COMPLICATION, WITHOUT LONG-TERM CURRENT USE OF INSULIN (MULTI): ICD-10-CM

## 2024-01-12 ENCOUNTER — TELEPHONE (OUTPATIENT)
Dept: PRIMARY CARE | Facility: CLINIC | Age: 74
End: 2024-01-12
Payer: MEDICARE

## 2024-01-12 DIAGNOSIS — D50.9 IRON DEFICIENCY ANEMIA, UNSPECIFIED IRON DEFICIENCY ANEMIA TYPE: Primary | ICD-10-CM

## 2024-01-12 RX ORDER — METFORMIN HYDROCHLORIDE 1000 MG/1
1000 TABLET ORAL 2 TIMES DAILY
Qty: 180 TABLET | Refills: 1 | Status: SHIPPED | OUTPATIENT
Start: 2024-01-12

## 2024-01-12 NOTE — TELEPHONE ENCOUNTER
THOMAS LOWRY,  I THOUGHT I HAD ORDERED THIS TEST, BUT I GUESS IT DIDN'T GO THROUGH.  WHE I TRY TO ORDER HEMOCCULT STOOL FROM THE LAB, A RED POP UP COMES UP SAYING ONLY LAB PERSONNEL ARE ALLOWED TO ORDER THAT TEST?  I THINK WE'VE HAD PROBLEMS WITH THIS IN THE PAST.  PERHAPS [facility] CAN ASSIST IN GETTING THIS EXPEDITED FOR THE PATIENT, BUT ALSO CORRECTED IN THE SYSTEM SO THAT PROVIDERS CAN ORDER THE TEST IN EP[facility].  THX

## 2024-01-12 NOTE — TELEPHONE ENCOUNTER
Pt called said she went [facility] and another  facility to get the occult kit you told her to get but she was told there is no order in the system and no instructions.  Please call her 1st thing Monday to advise what she is supposed to do.  I did not see any notes on this.

## 2024-01-19 ENCOUNTER — LAB (OUTPATIENT)
Dept: LAB | Facility: LAB | Age: 74
End: 2024-01-19
Payer: MEDICARE

## 2024-01-19 DIAGNOSIS — D50.9 IRON DEFICIENCY ANEMIA, UNSPECIFIED IRON DEFICIENCY ANEMIA TYPE: ICD-10-CM

## 2024-01-19 LAB — HEMOCCULT SP1 STL QL: NEGATIVE

## 2024-01-19 PROCEDURE — 82270 OCCULT BLOOD FECES: CPT

## 2024-01-24 ENCOUNTER — TELEPHONE (OUTPATIENT)
Dept: PRIMARY CARE | Facility: CLINIC | Age: 74
End: 2024-01-24

## 2024-01-24 DIAGNOSIS — D50.9 IRON DEFICIENCY ANEMIA, UNSPECIFIED IRON DEFICIENCY ANEMIA TYPE: Primary | ICD-10-CM

## 2024-01-24 NOTE — TELEPHONE ENCOUNTER
Remaining hemmocult orders were cx Im assuming because she dropped off her first 1 can you resubmit? They have to be separate orders thanks

## 2024-01-26 ENCOUNTER — LAB (OUTPATIENT)
Dept: LAB | Facility: LAB | Age: 74
End: 2024-01-26
Payer: MEDICARE

## 2024-01-26 DIAGNOSIS — D50.9 IRON DEFICIENCY ANEMIA, UNSPECIFIED IRON DEFICIENCY ANEMIA TYPE: ICD-10-CM

## 2024-01-26 LAB
HEMOCCULT SP1 STL QL: NEGATIVE
HEMOCCULT SP1 STL QL: NEGATIVE

## 2024-01-26 PROCEDURE — 82270 OCCULT BLOOD FECES: CPT

## 2024-03-28 ENCOUNTER — TELEPHONE (OUTPATIENT)
Dept: PRIMARY CARE | Facility: CLINIC | Age: 74
End: 2024-03-28
Payer: MEDICARE

## 2024-03-28 DIAGNOSIS — E78.00 HYPERCHOLESTEROLEMIA: Primary | ICD-10-CM

## 2024-03-28 DIAGNOSIS — E55.9 MILD VITAMIN D DEFICIENCY: ICD-10-CM

## 2024-03-28 DIAGNOSIS — E61.1 IRON DEFICIENCY: ICD-10-CM

## 2024-03-28 DIAGNOSIS — E11.9 TYPE 2 DIABETES MELLITUS WITHOUT COMPLICATION, WITHOUT LONG-TERM CURRENT USE OF INSULIN (MULTI): ICD-10-CM

## 2024-03-28 NOTE — TELEPHONE ENCOUNTER
Pt had her MCR wellness on 5/2 which needed to be re-scheduled out to 5/30.  Pt is asking if you could put a lab order in to include Iron?    I so, please put order in system so she can go for draw.

## 2024-04-09 ENCOUNTER — LAB (OUTPATIENT)
Dept: LAB | Facility: LAB | Age: 74
End: 2024-04-09
Payer: MEDICARE

## 2024-04-09 DIAGNOSIS — E78.00 HYPERCHOLESTEROLEMIA: ICD-10-CM

## 2024-04-09 DIAGNOSIS — E61.1 IRON DEFICIENCY: ICD-10-CM

## 2024-04-09 DIAGNOSIS — E11.9 TYPE 2 DIABETES MELLITUS WITHOUT COMPLICATION, WITHOUT LONG-TERM CURRENT USE OF INSULIN (MULTI): ICD-10-CM

## 2024-04-09 DIAGNOSIS — E55.9 MILD VITAMIN D DEFICIENCY: ICD-10-CM

## 2024-04-09 LAB
25(OH)D3 SERPL-MCNC: 24 NG/ML (ref 30–100)
ALBUMIN SERPL BCP-MCNC: 4.1 G/DL (ref 3.4–5)
ALP SERPL-CCNC: 54 U/L (ref 33–136)
ALT SERPL W P-5'-P-CCNC: 16 U/L (ref 7–45)
ANION GAP SERPL CALC-SCNC: 10 MMOL/L (ref 10–20)
AST SERPL W P-5'-P-CCNC: 21 U/L (ref 9–39)
BILIRUB SERPL-MCNC: 0.5 MG/DL (ref 0–1.2)
BUN SERPL-MCNC: 14 MG/DL (ref 6–23)
CALCIUM SERPL-MCNC: 8.6 MG/DL (ref 8.6–10.3)
CHLORIDE SERPL-SCNC: 100 MMOL/L (ref 98–107)
CHOLEST SERPL-MCNC: 161 MG/DL (ref 0–199)
CHOLESTEROL/HDL RATIO: 2.4
CO2 SERPL-SCNC: 30 MMOL/L (ref 21–32)
CREAT SERPL-MCNC: 0.81 MG/DL (ref 0.5–1.05)
CREAT UR-MCNC: 40.8 MG/DL (ref 20–320)
EGFRCR SERPLBLD CKD-EPI 2021: 77 ML/MIN/1.73M*2
ERYTHROCYTE [DISTWIDTH] IN BLOOD BY AUTOMATED COUNT: 14.9 % (ref 11.5–14.5)
EST. AVERAGE GLUCOSE BLD GHB EST-MCNC: 151 MG/DL
FERRITIN SERPL-MCNC: 20 NG/ML (ref 8–150)
GLUCOSE SERPL-MCNC: 119 MG/DL (ref 74–99)
HBA1C MFR BLD: 6.9 %
HCT VFR BLD AUTO: 38.3 % (ref 36–46)
HDLC SERPL-MCNC: 66 MG/DL
HGB BLD-MCNC: 12 G/DL (ref 12–16)
IRON SATN MFR SERPL: 24 % (ref 25–45)
IRON SERPL-MCNC: 99 UG/DL (ref 35–150)
LDLC SERPL CALC-MCNC: 80 MG/DL
MCH RBC QN AUTO: 25.2 PG (ref 26–34)
MCHC RBC AUTO-ENTMCNC: 31.3 G/DL (ref 32–36)
MCV RBC AUTO: 80 FL (ref 80–100)
NON HDL CHOLESTEROL: 95 MG/DL (ref 0–149)
NRBC BLD-RTO: 0 /100 WBCS (ref 0–0)
PLATELET # BLD AUTO: 270 X10*3/UL (ref 150–450)
POTASSIUM SERPL-SCNC: 4.4 MMOL/L (ref 3.5–5.3)
PROT SERPL-MCNC: 6.9 G/DL (ref 6.4–8.2)
PROT UR-ACNC: 5 MG/DL (ref 5–24)
PROT/CREAT UR: 0.12 MG/MG CREAT (ref 0–0.17)
RBC # BLD AUTO: 4.77 X10*6/UL (ref 4–5.2)
SODIUM SERPL-SCNC: 136 MMOL/L (ref 136–145)
TIBC SERPL-MCNC: 406 UG/DL (ref 240–445)
TRIGL SERPL-MCNC: 77 MG/DL (ref 0–149)
TSH SERPL-ACNC: 2.9 MIU/L (ref 0.44–3.98)
UIBC SERPL-MCNC: 307 UG/DL (ref 110–370)
VLDL: 15 MG/DL (ref 0–40)
WBC # BLD AUTO: 5.5 X10*3/UL (ref 4.4–11.3)

## 2024-04-09 PROCEDURE — 85027 COMPLETE CBC AUTOMATED: CPT

## 2024-04-09 PROCEDURE — 82728 ASSAY OF FERRITIN: CPT

## 2024-04-09 PROCEDURE — 83036 HEMOGLOBIN GLYCOSYLATED A1C: CPT

## 2024-04-09 PROCEDURE — 84156 ASSAY OF PROTEIN URINE: CPT

## 2024-04-09 PROCEDURE — 83550 IRON BINDING TEST: CPT

## 2024-04-09 PROCEDURE — 80061 LIPID PANEL: CPT

## 2024-04-09 PROCEDURE — 83540 ASSAY OF IRON: CPT

## 2024-04-09 PROCEDURE — 80053 COMPREHEN METABOLIC PANEL: CPT

## 2024-04-09 PROCEDURE — 82306 VITAMIN D 25 HYDROXY: CPT

## 2024-04-09 PROCEDURE — 82570 ASSAY OF URINE CREATININE: CPT

## 2024-04-09 PROCEDURE — 36415 COLL VENOUS BLD VENIPUNCTURE: CPT

## 2024-04-09 PROCEDURE — 84443 ASSAY THYROID STIM HORMONE: CPT

## 2024-05-02 ENCOUNTER — APPOINTMENT (OUTPATIENT)
Dept: PRIMARY CARE | Facility: CLINIC | Age: 74
End: 2024-05-02
Payer: MEDICARE

## 2024-05-30 ENCOUNTER — OFFICE VISIT (OUTPATIENT)
Dept: PRIMARY CARE | Facility: CLINIC | Age: 74
End: 2024-05-30
Payer: MEDICARE

## 2024-05-30 VITALS
BODY MASS INDEX: 23.39 KG/M2 | RESPIRATION RATE: 14 BRPM | WEIGHT: 137 LBS | OXYGEN SATURATION: 100 % | DIASTOLIC BLOOD PRESSURE: 80 MMHG | SYSTOLIC BLOOD PRESSURE: 128 MMHG | HEART RATE: 68 BPM | HEIGHT: 64 IN

## 2024-05-30 DIAGNOSIS — T46.6X5A MYALGIA DUE TO STATIN: ICD-10-CM

## 2024-05-30 DIAGNOSIS — R19.4 ALTERED BOWEL HABITS: ICD-10-CM

## 2024-05-30 DIAGNOSIS — E78.00 HYPERCHOLESTEROLEMIA: ICD-10-CM

## 2024-05-30 DIAGNOSIS — E61.1 IRON DEFICIENCY: ICD-10-CM

## 2024-05-30 DIAGNOSIS — Z00.00 ROUTINE GENERAL MEDICAL EXAMINATION AT HEALTH CARE FACILITY: ICD-10-CM

## 2024-05-30 DIAGNOSIS — Z78.0 ASYMPTOMATIC MENOPAUSAL STATE: ICD-10-CM

## 2024-05-30 DIAGNOSIS — E73.9 LACTOSE INTOLERANCE: ICD-10-CM

## 2024-05-30 DIAGNOSIS — M79.10 MYALGIA: ICD-10-CM

## 2024-05-30 DIAGNOSIS — M25.551 PAIN OF RIGHT HIP: ICD-10-CM

## 2024-05-30 DIAGNOSIS — Z23 NEED FOR VACCINATION: ICD-10-CM

## 2024-05-30 DIAGNOSIS — E11.9 TYPE 2 DIABETES MELLITUS WITHOUT COMPLICATION, WITHOUT LONG-TERM CURRENT USE OF INSULIN (MULTI): ICD-10-CM

## 2024-05-30 DIAGNOSIS — Z00.00 MEDICARE ANNUAL WELLNESS VISIT, SUBSEQUENT: Primary | ICD-10-CM

## 2024-05-30 DIAGNOSIS — M79.10 MYALGIA DUE TO STATIN: ICD-10-CM

## 2024-05-30 PROCEDURE — 1124F ACP DISCUSS-NO DSCNMKR DOCD: CPT | Performed by: INTERNAL MEDICINE

## 2024-05-30 PROCEDURE — 3044F HG A1C LEVEL LT 7.0%: CPT | Performed by: INTERNAL MEDICINE

## 2024-05-30 PROCEDURE — 3074F SYST BP LT 130 MM HG: CPT | Performed by: INTERNAL MEDICINE

## 2024-05-30 PROCEDURE — 1159F MED LIST DOCD IN RCRD: CPT | Performed by: INTERNAL MEDICINE

## 2024-05-30 PROCEDURE — 1170F FXNL STATUS ASSESSED: CPT | Performed by: INTERNAL MEDICINE

## 2024-05-30 PROCEDURE — 3079F DIAST BP 80-89 MM HG: CPT | Performed by: INTERNAL MEDICINE

## 2024-05-30 PROCEDURE — 3048F LDL-C <100 MG/DL: CPT | Performed by: INTERNAL MEDICINE

## 2024-05-30 PROCEDURE — 3061F NEG MICROALBUMINURIA REV: CPT | Performed by: INTERNAL MEDICINE

## 2024-05-30 PROCEDURE — G0439 PPPS, SUBSEQ VISIT: HCPCS | Performed by: INTERNAL MEDICINE

## 2024-05-30 PROCEDURE — 99213 OFFICE O/P EST LOW 20 MIN: CPT | Performed by: INTERNAL MEDICINE

## 2024-05-30 RX ORDER — LANOLIN ALCOHOL/MO/W.PET/CERES
1000 CREAM (GRAM) TOPICAL DAILY
COMMUNITY

## 2024-05-30 RX ORDER — FERROUS SULFATE 325(65) MG
325 TABLET ORAL
COMMUNITY

## 2024-05-30 RX ORDER — PITAVASTATIN CALCIUM 2.09 MG/1
2 TABLET, FILM COATED ORAL DAILY
Qty: 90 TABLET | Refills: 3 | Status: SHIPPED | OUTPATIENT
Start: 2024-05-30

## 2024-05-30 ASSESSMENT — ACTIVITIES OF DAILY LIVING (ADL)
BATHING: INDEPENDENT
MANAGING_FINANCES: INDEPENDENT
GROCERY_SHOPPING: INDEPENDENT
DOING_HOUSEWORK: INDEPENDENT
TAKING_MEDICATION: INDEPENDENT
DRESSING: INDEPENDENT

## 2024-05-30 ASSESSMENT — PATIENT HEALTH QUESTIONNAIRE - PHQ9
SUM OF ALL RESPONSES TO PHQ9 QUESTIONS 1 AND 2: 0
1. LITTLE INTEREST OR PLEASURE IN DOING THINGS: NOT AT ALL
2. FEELING DOWN, DEPRESSED OR HOPELESS: NOT AT ALL
SUM OF ALL RESPONSES TO PHQ9 QUESTIONS 1 AND 2: 0
2. FEELING DOWN, DEPRESSED OR HOPELESS: NOT AT ALL
1. LITTLE INTEREST OR PLEASURE IN DOING THINGS: NOT AT ALL

## 2024-05-30 NOTE — PROGRESS NOTES
"Subjective   Reason for Visit: Sylvia Ramos is an 74 y.o. female here for a Medicare Wellness visit.    RF NEEDED ON LEVOTHYROXINE   LEFT HIP AND LEFT CALF PAIN SAYS CALF PAIN IN OLD INJURY 2019  HURTS TO SLEEP ON HIP     Past Medical, Surgical, and Family History reviewed and updated in chart.         HPI  PAIN LEFT HIP WORSE AT NIGHT.  STARTED 2-3 WEEKS AGO.    THROBBING PAIN LEFT HIP RETURNS IF LAY ON EITHER SIDE OR WHEN TRY TO WALK UPHILL    PAIN IN CALF FROM AN OLD GYM INJURY FROM 2019.    BEEN IN PITAVASTATIN AND THIGH MUSCLE ACHES ARE THE SAME.    SINCE HAVING PAIN HAS INCREASED BLOOD GLUCOSE FROM 110'S 'S    WAS TAKING OTHER STATIN BEFORE, HAD SIMILAR PAINS, THEN TAKING LIVALO, NO PAIN, NOW SWITCHED TO PITAVASTATIN AND HAVING PAINS AGAIN SIMILAR TO BEFORE.    HAVING CONSTIPATION AND DIARRHEA BEFORE AND AFTER BOWEL MOVEMENTS.  BLOATING AND CRAMPING.  GOT LACTAID MILK AND ALL SYMPTOMS DISAPPEARED FROM A GI STANDPOINT.  HAVING A PROBLEM NUTRITION WISE FOR CALCIUM          Patient Care Team:  Miguel Bruce MD as PCP - General (Internal Medicine)  Miguel Bruce MD as PCP - [facility] A[facility] Attributed Provider     Review of Systems   Constitutional:  Negative for chills, diaphoresis and fever.   Respiratory:  Negative for cough and shortness of breath.    Cardiovascular:  Negative for chest pain and leg swelling.   Gastrointestinal:  Negative for constipation, diarrhea, nausea and vomiting.   Musculoskeletal:  Positive for myalgias. Negative for joint swelling.       Objective   Vitals:  /80   Pulse 68   Resp 14   Ht 1.626 m (5' 4\")   Wt 62.1 kg (137 lb)   SpO2 100%   BMI 23.52 kg/m²       Physical Exam  Vitals reviewed.   Constitutional:       General: She is not in acute distress.     Appearance: She is not ill-appearing.   Cardiovascular:      Rate and Rhythm: Normal rate and regular rhythm.      Pulses: Normal pulses.      Heart sounds:      No gallop.   Pulmonary:      Breath sounds: " Normal breath sounds. No wheezing, rhonchi or rales.   Abdominal:      General: Abdomen is flat. Bowel sounds are normal.      Palpations: Abdomen is soft.      Tenderness: There is no guarding or rebound.   Musculoskeletal:      Right lower leg: No edema.      Left lower leg: No edema.         Assessment/Plan   Problem List Items Addressed This Visit       Diabetes mellitus (Multi)    Current Assessment & Plan     STABLE ON CURRENT REGIMEN         Hypercholesterolemia    Relevant Medications    Livalo 2 mg tablet    Myalgia    Medicare annual wellness visit, subsequent - Primary     Other Visit Diagnoses       Lactose intolerance        Relevant Orders    Referral to Nutrition Services    Myalgia due to statin        Relevant Orders    CK (Completed)    Aldolase (Completed)    C-Reactive Protein (Completed)    Sedimentation Rate (Completed)    Altered bowel habits        Relevant Orders    Referral to Gastroenterology    Iron deficiency        Relevant Orders    Referral to Gastroenterology    Pain of right hip        Relevant Orders    Referral to Sports Medicine    Need for vaccination        Asymptomatic menopausal state        Relevant Orders    XR DEXA bone density    Routine general medical examination at health care facility              Patient Instructions    SHINGRIX IMMUNIZATION IS RECOMMENDED FOR YOU FOR WHEN YOUR FEELING BETTER, SCRIPT PRINTED OUT FOR YOU.  TETANUS/PERTUSSIS UPDATED IMMUNIZATION IS ALSO RECOMMENDED FOR YOU, YOU CAN GET AT THE PHARMACY AS WELL    2.  WILL DO LABS TO EXCLUDE MUSCLE BREAKDOWN (MYOSITIS) THAT MIGHT BE HAPPENING FROM THE PITAVASTATIN.  FOR NOW YOU CAN STOP PITAVASTATIN TO SEE IF YOUR PAINS CAN RESOLVE, AND THEN I SENT IN SCRIPT FOR TRADE NAME LIVALO FOR YOU TO RESTART WHEN ABLE.    3.  YOU MAY START CO-ENZYME Q10 200 MG ONCE DAILY TO TRY TO HELP PREVENT ONGOING STATIN-ASSOCIATED MUSCLE ACHES.    4.  REFER BACK TO GASTROENTEROLOGY DUE TO ALTERED BOWEL HABIT AND IRON  DEFICIENCY.      5.  ONGOING REGULAR EYE EXAMS AS YOU ARE DOING    6.  REFER TO SPORTS MEDICINE DR. STANLEY REGARDING THE POSSIBLE SPORTS-RELATED INJURY TO THE LEFT HIP ADDUCTOR MUSCLE GROUP    7.  OTHERWISE FOLLOW UP HERE IN 6 MONTHS.      8.  REFERRAL TO NUTRITION IS MADE AS WELL FOR TIPS ON LACTOSE FREE DIET AND MAINTAINING GOOD NUTRITION

## 2024-05-30 NOTE — PATIENT INSTRUCTIONS
SHINGRIX IMMUNIZATION IS RECOMMENDED FOR YOU FOR WHEN YOUR FEELING BETTER, SCRIPT PRINTED OUT FOR YOU.  TETANUS/PERTUSSIS UPDATED IMMUNIZATION IS ALSO RECOMMENDED FOR YOU, YOU CAN GET AT THE PHARMACY AS WELL    2.  WILL DO LABS TO EXCLUDE MUSCLE BREAKDOWN (MYOSITIS) THAT MIGHT BE HAPPENING FROM THE PITAVASTATIN.  FOR NOW YOU CAN STOP PITAVASTATIN TO SEE IF YOUR PAINS CAN RESOLVE, AND THEN I SENT IN SCRIPT FOR TRADE NAME LIVALO FOR YOU TO RESTART WHEN ABLE.    3.  YOU MAY START CO-ENZYME Q10 200 MG ONCE DAILY TO TRY TO HELP PREVENT ONGOING STATIN-ASSOCIATED MUSCLE ACHES.    4.  REFER BACK TO GASTROENTEROLOGY DUE TO ALTERED BOWEL HABIT AND IRON DEFICIENCY.      5.  ONGOING REGULAR EYE EXAMS AS YOU ARE DOING    6.  REFER TO SPORTS MEDICINE DR. STANLEY REGARDING THE POSSIBLE SPORTS-RELATED INJURY TO THE LEFT HIP ADDUCTOR MUSCLE GROUP    7.  OTHERWISE FOLLOW UP HERE IN 6 MONTHS.      8.  REFERRAL TO NUTRITION IS MADE AS WELL FOR TIPS ON LACTOSE FREE DIET AND MAINTAINING GOOD NUTRITION

## 2024-05-31 ENCOUNTER — LAB (OUTPATIENT)
Dept: LAB | Facility: LAB | Age: 74
End: 2024-05-31
Payer: MEDICARE

## 2024-05-31 DIAGNOSIS — T46.6X5A MYALGIA DUE TO STATIN: ICD-10-CM

## 2024-05-31 DIAGNOSIS — M79.10 MYALGIA DUE TO STATIN: ICD-10-CM

## 2024-05-31 LAB
CK SERPL-CCNC: 94 U/L (ref 0–215)
CRP SERPL-MCNC: 0.28 MG/DL
ERYTHROCYTE [SEDIMENTATION RATE] IN BLOOD BY WESTERGREN METHOD: 22 MM/H (ref 0–30)

## 2024-05-31 PROCEDURE — 86140 C-REACTIVE PROTEIN: CPT

## 2024-05-31 PROCEDURE — 82550 ASSAY OF CK (CPK): CPT

## 2024-05-31 PROCEDURE — 82085 ASSAY OF ALDOLASE: CPT

## 2024-05-31 PROCEDURE — 36415 COLL VENOUS BLD VENIPUNCTURE: CPT

## 2024-05-31 PROCEDURE — 85652 RBC SED RATE AUTOMATED: CPT

## 2024-06-02 LAB — ALDOLASE SERPL-CCNC: 5.3 U/L (ref 1.2–7.6)

## 2024-06-03 PROBLEM — Z00.00 MEDICARE ANNUAL WELLNESS VISIT, SUBSEQUENT: Status: ACTIVE | Noted: 2024-06-03

## 2024-06-03 PROBLEM — I20.89 CHRONIC STABLE ANGINA (CMS-HCC): Status: RESOLVED | Noted: 2023-04-26 | Resolved: 2024-06-03

## 2024-06-03 ASSESSMENT — ENCOUNTER SYMPTOMS
VOMITING: 0
FEVER: 0
COUGH: 0
JOINT SWELLING: 0
DIARRHEA: 0
CHILLS: 0
DIAPHORESIS: 0
CONSTIPATION: 0
NAUSEA: 0
SHORTNESS OF BREATH: 0
MYALGIAS: 1

## 2024-06-13 ENCOUNTER — HOSPITAL ENCOUNTER (OUTPATIENT)
Dept: RADIOLOGY | Facility: CLINIC | Age: 74
Discharge: HOME | End: 2024-06-13
Payer: MEDICARE

## 2024-06-13 ENCOUNTER — HOSPITAL ENCOUNTER (OUTPATIENT)
Dept: RADIOLOGY | Facility: HOSPITAL | Age: 74
Discharge: HOME | End: 2024-06-13
Payer: MEDICARE

## 2024-06-13 ENCOUNTER — APPOINTMENT (OUTPATIENT)
Dept: ORTHOPEDIC SURGERY | Facility: CLINIC | Age: 74
End: 2024-06-13
Payer: MEDICARE

## 2024-06-13 DIAGNOSIS — M25.551 PAIN OF RIGHT HIP: ICD-10-CM

## 2024-06-13 DIAGNOSIS — M79.662 PAIN IN SHIN, LEFT: ICD-10-CM

## 2024-06-13 DIAGNOSIS — M25.551 HIP PAIN, RIGHT: ICD-10-CM

## 2024-06-13 DIAGNOSIS — M67.952 TENDINOPATHY OF LEFT GLUTEUS MEDIUS: Primary | ICD-10-CM

## 2024-06-13 DIAGNOSIS — Z78.0 ASYMPTOMATIC MENOPAUSAL STATE: ICD-10-CM

## 2024-06-13 PROCEDURE — 99204 OFFICE O/P NEW MOD 45 MIN: CPT | Performed by: FAMILY MEDICINE

## 2024-06-13 PROCEDURE — 73502 X-RAY EXAM HIP UNI 2-3 VIEWS: CPT | Mod: LEFT SIDE | Performed by: RADIOLOGY

## 2024-06-13 PROCEDURE — 1125F AMNT PAIN NOTED PAIN PRSNT: CPT | Performed by: FAMILY MEDICINE

## 2024-06-13 PROCEDURE — 73590 X-RAY EXAM OF LOWER LEG: CPT | Mod: LT

## 2024-06-13 PROCEDURE — 77080 DXA BONE DENSITY AXIAL: CPT

## 2024-06-13 PROCEDURE — 1036F TOBACCO NON-USER: CPT | Performed by: FAMILY MEDICINE

## 2024-06-13 PROCEDURE — 3044F HG A1C LEVEL LT 7.0%: CPT | Performed by: FAMILY MEDICINE

## 2024-06-13 PROCEDURE — 1160F RVW MEDS BY RX/DR IN RCRD: CPT | Performed by: FAMILY MEDICINE

## 2024-06-13 PROCEDURE — 3048F LDL-C <100 MG/DL: CPT | Performed by: FAMILY MEDICINE

## 2024-06-13 PROCEDURE — 1159F MED LIST DOCD IN RCRD: CPT | Performed by: FAMILY MEDICINE

## 2024-06-13 PROCEDURE — 73502 X-RAY EXAM HIP UNI 2-3 VIEWS: CPT | Mod: LT

## 2024-06-13 PROCEDURE — 3061F NEG MICROALBUMINURIA REV: CPT | Performed by: FAMILY MEDICINE

## 2024-06-13 ASSESSMENT — PAIN SCALES - GENERAL
PAINLEVEL_OUTOF10: 2
PAINLEVEL_OUTOF10: 5 - MODERATE PAIN

## 2024-06-13 ASSESSMENT — PAIN - FUNCTIONAL ASSESSMENT
PAIN_FUNCTIONAL_ASSESSMENT: 0-10
PAIN_FUNCTIONAL_ASSESSMENT: 0-10

## 2024-06-13 ASSESSMENT — PAIN DESCRIPTION - DESCRIPTORS
DESCRIPTORS: ACHING;OTHER (COMMENT)
DESCRIPTORS: ACHING

## 2024-06-13 NOTE — PROGRESS NOTES
Subjective    Patient ID: Sylvia Ramos is a 74 y.o. female.    Chief Complaint: Pain of the Left Hip and Pain of the Left Lower Leg  Sylvia is a very pleasant 74-year-old female who presents with pain in the left lower extremity.  She states that both her hip and her shin.  The shin injury was from back in 2019 when working out on an elliptical.  That resolved until the hip pain began approximately 1 month ago.  She cannot remember any specific injury or trauma to the hip.  When asked to point where the pain is, she points mostly laterally.  She is having trouble sleeping on that side.  She rates the pain as a 5 out of 10 at the hip and a 2 out of 10 at the shin.  Both seem to be gradually improving.    Objective   Musculoskeletal: Left hip-no deformity or asymmetry when compared to the opposite side.  She has some tenderness to palpation over the lateral aspect of the hip and greater trochanter.  Otherwise her exam is fairly unremarkable.  She has a negative logroll.  Negative acetabular grind.  Straight leg raise is negative.  Resisted hip abduction and abduction is negative.  She is neurovascularly intact distally.  She does have some mild tenderness to palpation over the anterior aspect of the shin.    Image Results:  X-rays: 2 views of the left shin revealed no evidence of acute bony abnormalities.  4 views of the left hip and pelvis revealed some mild osteoarthritis, but no evidence of acute bony abnormalities.    Assessment/Plan   Encounter Diagnoses:  Tendinopathy of left gluteus medius    Hip pain, right    Pain of right hip    Pain in shin, left    Orders Placed This Encounter    XR tibia fibula left 2 views    XR hip left with pelvis when performed 2 or 3 views    Referral to Physical Therapy     Given that this seems to be improving, want her to start on formal physical therapy.  She has done aqua therapy as well which I think will help.  They will also work on her shin in PT.  She can follow-up with me  in a month.  If it is not any better, we will consider doing a steroid injection to the lateral hip.  She can continue with her current medication regimen.  All of her questions were answered and she agrees with treatment plan.    ** Please excuse any errors in grammar or translation related to this dictation. Voice recognition software was utilized to prepare this document. **    Junior Gama M.D.  Clinical , Division of Sports Medicine  Primary Care Sports Medicine  Department of Orthopedic Surgery  [facility]

## 2024-07-08 DIAGNOSIS — E11.9 CONTROLLED TYPE 2 DIABETES MELLITUS WITHOUT COMPLICATION, WITHOUT LONG-TERM CURRENT USE OF INSULIN (MULTI): ICD-10-CM

## 2024-07-08 RX ORDER — METFORMIN HYDROCHLORIDE 1000 MG/1
1000 TABLET ORAL 2 TIMES DAILY
Qty: 180 TABLET | Refills: 3 | Status: SHIPPED | OUTPATIENT
Start: 2024-07-08

## 2024-07-10 PROBLEM — E73.9 LACTOSE INTOLERANCE: Status: ACTIVE | Noted: 2024-07-10

## 2024-07-10 NOTE — PROGRESS NOTES
Reason for Nutrition Visit:  Pt is a 74 y.o. female being seen at [facility] in [place] referred for   1. Lactose intolerance  Referral to Nutrition Services      2. Type 2 diabetes mellitus without complication, without long-term current use of insulin (Multi)           Past Medical Hx:  Patient Active Problem List   Diagnosis    Cervical polyp    Cervical radicular pain    Chest pain    Chronic cough    Diabetes mellitus (Multi)    Dusky discoloration of skin    Dysuria    Fatigue    Hematochezia    Herpes zoster without complication    Hypercholesterolemia    Hypothyroidism    Low back pain with right-sided sciatica    Mild vitamin D deficiency    Myalgia    Rash of body    Raynaud's phenomenon    Recurrent urinary tract infection    Shortness of breath on exertion    Vaginal irritation    Varicose veins of legs    Chronic left shoulder pain    Cortical senile cataract of both eyes    Genitourinary syndrome of menopause    Hemangioma of skin and subcutaneous tissue    Herpesviral infection, unspecified    Melanocytic nevi of trunk    Other hypertrophic disorders of the skin    Refraction error    Squamous blepharitis of upper and lower eyelids of both eyes    Vitreous syneresis of both eyes    Hypothyroid    Medicare annual wellness visit, subsequent    Tendinopathy of left gluteus medius    Lactose intolerance      Nutrition Assessment    Food & Nutrition Related History:  She is here to discuss balanced nutrition while avoiding lactose due to lactose intolerance.   She also has diabetes.   She is a retired nurse, worked at [employer].     Food Allergies: None  Intolerance: Lactose. She said that when she was a child she consumed powdered milk when she lived in an orphanage, and she remembers having reflux when she drank the milk. When she moved to [place] she said she tolerated the fresh milk for years, but more recently has developed lactose intolerance. She can tolerate a few bites of ice cream, but she  can't tolerate drinking a glass of milk. She has been using Lactaid milk. She is uncertain whether she can eat cheese or other dairy foods.   Appetite: Good  GI Symptoms : reflux / burning in throat at times , and her stool texture varies - sometimes alternates loose stools and firmer stools  Swallowing Difficulty: No problems with swallowing  Chewing no problems chewing  Food Preparation: Patient  Cooking Skills/Barriers: None reported  Grocery Shopping: Patient  Food Insecurity: did not assess  Supplements: 250 calcium in the morning and 250 mg calcium at night (the pill also includes 10 mg zn per day, and 800 international units of vitamin D3), also taking iron sulfate 65 mg daily. CoQ10 (to help with statin intolerance), and B12  (1,000 mcg sublingual)    Dietary Recall:   Meal 1: breakfast  - slice of multigrain toast (or half), a little butter & jam, vegetable (beans, corn or beets - she prepares them ahead of time) and a slice of deli meat (ham, turkey or beef). Half a banana.     Meal 2: lunch  - soup and salad  - or fruit with dry cereal (Raisin Bran or plain bran flakes with her own slivered almonds added and raisins & blueberries)   4 pm Tea  - with 1-2 cookies, she takes note of what her BG is before eating  Meal 3: dinner  - breaded fish  - salad if she didn't eat it for lunch  - or grilled cheese sandwich  - or TV dinner, chooses vegetarian variety, eats half - includes rice typically of about 1/2 cup portion size.   HS: small snack with medicaitons. Crackers with PB or a date or two. Fresh fruit as well (pineapple, or a few cslices of violeta)    Beverages: water, Lactaid milk, occasionally juice (1/3 cup about) diluted with water. Sometimes she drinks a little cranberry juice when she has a UTI.      She brought her glucometer to the visit: 33% in range, about 1 check per day, 155 highest, 137 average, 123 lowest (FBG)     Physical Activity: did not discuss    Labs:  Lab Results   Component Value Date  "   HGBA1C 6.9 (H) 04/09/2024    HGBA1C 6.8 (H) 10/30/2023    HGBA1C 6.8 (A) 04/27/2023     04/09/2024    K 4.4 04/09/2024     04/09/2024    CO2 30 04/09/2024    BUN 14 04/09/2024    CREATININE 0.81 04/09/2024    CALCIUM 8.6 04/09/2024    ALBUMIN 4.1 04/09/2024    PROT 6.9 04/09/2024    BILITOT 0.5 04/09/2024    ALKPHOS 54 04/09/2024    ALT 16 04/09/2024    AST 21 04/09/2024    GLUCOSE 119 (H) 04/09/2024     Lab Results   Component Value Date    CHOL 161 04/09/2024    LDLCALC 80 04/09/2024    TRIG 77 04/09/2024    HDL 66.0 04/09/2024    LDLF 73 04/27/2023      Nutrition Focused Physical Exam:    Performed/Deferred: Deferred as pt visually appears well-nourished with no signs of malnutrition    Anthropometrics:  Ht Readings from Last 1 Encounters:   05/30/24 1.626 m (5' 4\")     BMI Readings from Last 1 Encounters:   05/30/24 23.52 kg/m²     Wt Readings from Last 10 Encounters:   05/30/24 62.1 kg (137 lb)   11/13/23 64.3 kg (141 lb 11.2 oz)   10/30/23 63 kg (139 lb)   09/27/23 62.1 kg (137 lb)   04/27/23 61.6 kg (135 lb 12.8 oz)   12/15/22 63.5 kg (140 lb)   11/02/22 63.5 kg (140 lb)   10/17/22 61.7 kg (136 lb)   10/11/22 63.8 kg (140 lb 10.5 oz)   09/16/22 61.7 kg (136 lb)     Weight change: stable within 5# in the past 2 years  Significant weight change: No    Estimated Nutrition Needs:    Total Energy Estimated Needs (kCal): 1600 kCal       Total Protein Estimated Needs (g): 60 g        Nutrition Diagnosis     Patient has Malnutrition Diagnosis: No        Patient has Nutrition Diagnosis: Yes Diagnosis Status (1): New  Nutrition Diagnosis 1: Food and nutrition related knowledge deficit Related to (1): lack of exposure to information As Evidenced by (1): patient has questions about guidelines on a low-lactose diet     Nutrition Interventions/Recommendations   Nutrition education on the following diet topic(s): Decreased Carbohydrate and low lactose    Coordination of Care: None    Education:  Reviewed what " lactose is, how to note whether a food contains it (reviewed labels for cheddar cheese, butter) and alternatives to choose such as Lactaid milk, A2 / Fairlife milk, plant-based milks like soy or almond. Also encouraged her to try vegan products such as vegan yogurt. Explained she can also try lactase enzyme supplement with meals (Lactaid is one name brand) if she anticipates consuming some unavoidable lactose, or if she wants to eat a low-lactose product such as a regular yogurt. Talked about recipe for homemade soy yogurt or cottage cheese.   We reviewed her dietary supplements and also reviewed food sources of calcium.   Also reviewed her blood sugar log, she has some values in the morning and evening that are higher than target range. Encouraged her to limit carbohydrate in her diet - we talked about limiting added honey in tea, and also not to drink juice. Explained that cranberry juice can do more harm than good with diabetes because it can make blood sugar high.     Handouts: NCM low lactose, NCM high calcium foods, and list of lactose content of foods    *Patient expressed understanding of the education provided and denied any additional questions/concerns.     Monitoring & Evaluation:  Monitoring & Evaluation: Amount of Food - Estimated, Types of Food, and Meal/Snack Pattern - Estimated, diet tolerance    Nutrition Goals:  She will continue to use Lactaid milk or will also try A2 (Fairlife) milk, and will try vegan / lactose-free dairy alternatives to standard foods that contain lactose  She will avoid cranberry juice or other juices, and will limit/avoid honey or other added sugars      Follow up Plan:   PRN, patient will call to schedule follow up if desired    Readiness to Change : Good  Level of Understanding : Good  Anticipated Compliant : Good

## 2024-07-11 ENCOUNTER — APPOINTMENT (OUTPATIENT)
Dept: PRIMARY CARE | Facility: CLINIC | Age: 74
End: 2024-07-11
Payer: MEDICARE

## 2024-07-11 DIAGNOSIS — E73.9 LACTOSE INTOLERANCE: Primary | ICD-10-CM

## 2024-07-11 DIAGNOSIS — E11.9 TYPE 2 DIABETES MELLITUS WITHOUT COMPLICATION, WITHOUT LONG-TERM CURRENT USE OF INSULIN (MULTI): ICD-10-CM

## 2024-07-11 PROCEDURE — 97802 MEDICAL NUTRITION INDIV IN: CPT | Performed by: DIETITIAN, REGISTERED

## 2024-07-11 NOTE — PATIENT INSTRUCTIONS
Reviewed what lactose is, how to note whether a food contains it (reviewed labels for cheddar cheese, butter) and alternatives to choose such as Lactaid milk, A2 / Fairlife milk, plant-based milks like soy or almond. Also encouraged her to try vegan products such as vegan yogurt. Explained she can also try lactase enzyme supplement with meals (Lactaid is one name brand) if she anticipates consuming some unavoidable lactose, or if she wants to eat a low-lactose product such as a regular yogurt. Talked about recipe for homemade soy yogurt or cottage cheese.   We reviewed her dietary supplements and also reviewed food sources of calcium.   Also reviewed her blood sugar log, she has some values in the morning and evening that are higher than target range. Encouraged her to limit carbohydrate in her diet - we talked about limiting added honey in tea, and also not to drink juice. Explained that cranberry juice can do more harm than good with diabetes because it can make blood sugar high.

## 2024-08-09 ENCOUNTER — LAB REQUISITION (OUTPATIENT)
Dept: LAB | Facility: HOSPITAL | Age: 74
End: 2024-08-09
Payer: MEDICARE

## 2024-08-09 ENCOUNTER — TELEPHONE (OUTPATIENT)
Dept: PRIMARY CARE | Facility: CLINIC | Age: 74
End: 2024-08-09
Payer: MEDICARE

## 2024-08-09 DIAGNOSIS — R30.0 DYSURIA: ICD-10-CM

## 2024-08-09 PROCEDURE — 87086 URINE CULTURE/COLONY COUNT: CPT

## 2024-08-09 NOTE — TELEPHONE ENCOUNTER
MARKO.....Pt called to advise she has been sick x/1wk now having throat pain and her pallett is very sore and hard to eat.      Dr. ALDANA not in today and no availability in office.  Advised her to use the  Urgent care on [address].  She said she would go there today.

## 2024-08-10 ENCOUNTER — TELEPHONE (OUTPATIENT)
Dept: PRIMARY CARE | Facility: CLINIC | Age: 74
End: 2024-08-10
Payer: MEDICARE

## 2024-08-11 LAB — BACTERIA UR CULT: ABNORMAL

## 2024-08-12 LAB — BACTERIA UR CULT: ABNORMAL

## 2024-08-16 ENCOUNTER — TELEPHONE (OUTPATIENT)
Dept: PRIMARY CARE | Facility: CLINIC | Age: 74
End: 2024-08-16
Payer: MEDICARE

## 2024-08-28 ENCOUNTER — APPOINTMENT (OUTPATIENT)
Dept: GASTROENTEROLOGY | Facility: CLINIC | Age: 74
End: 2024-08-28
Payer: MEDICARE

## 2024-08-28 VITALS — BODY MASS INDEX: 23.52 KG/M2 | OXYGEN SATURATION: 98 % | HEIGHT: 64 IN | WEIGHT: 137.8 LBS | HEART RATE: 69 BPM

## 2024-08-28 DIAGNOSIS — E73.9 LACTOSE INTOLERANCE: ICD-10-CM

## 2024-08-28 DIAGNOSIS — K59.04 CHRONIC IDIOPATHIC CONSTIPATION: ICD-10-CM

## 2024-08-28 DIAGNOSIS — D50.9 IRON DEFICIENCY ANEMIA, UNSPECIFIED IRON DEFICIENCY ANEMIA TYPE: Primary | ICD-10-CM

## 2024-08-28 PROCEDURE — 99204 OFFICE O/P NEW MOD 45 MIN: CPT | Performed by: INTERNAL MEDICINE

## 2024-08-28 PROCEDURE — 3008F BODY MASS INDEX DOCD: CPT | Performed by: INTERNAL MEDICINE

## 2024-08-28 PROCEDURE — 3048F LDL-C <100 MG/DL: CPT | Performed by: INTERNAL MEDICINE

## 2024-08-28 PROCEDURE — 3061F NEG MICROALBUMINURIA REV: CPT | Performed by: INTERNAL MEDICINE

## 2024-08-28 PROCEDURE — 1159F MED LIST DOCD IN RCRD: CPT | Performed by: INTERNAL MEDICINE

## 2024-08-28 PROCEDURE — 1036F TOBACCO NON-USER: CPT | Performed by: INTERNAL MEDICINE

## 2024-08-28 PROCEDURE — 3044F HG A1C LEVEL LT 7.0%: CPT | Performed by: INTERNAL MEDICINE

## 2024-08-28 RX ORDER — POLYETHYLENE GLYCOL 3350, SODIUM SULFATE ANHYDROUS, SODIUM BICARBONATE, SODIUM CHLORIDE, POTASSIUM CHLORIDE 236; 22.74; 6.74; 5.86; 2.97 G/4L; G/4L; G/4L; G/4L; G/4L
4 POWDER, FOR SOLUTION ORAL ONCE
Qty: 4000 ML | Refills: 0 | Status: SHIPPED | OUTPATIENT
Start: 2024-08-28 | End: 2024-08-28

## 2024-08-28 ASSESSMENT — ENCOUNTER SYMPTOMS
CONSTITUTIONAL NEGATIVE: 1
ARTHRALGIAS: 1
SHORTNESS OF BREATH: 1
HEADACHES: 1
DIZZINESS: 1
COUGH: 1

## 2024-08-28 NOTE — PROGRESS NOTES
Subjective     History of Present Illness:     75yo with DL, Dm, hypothyroidism seen for abdominal pain and bloating, anemia.  This year alternating diarrhea/ constipation, bloating and abdominal discomfort.  In  long walks would lead to rectal bleeding now avoiding long walks and no bleeding.  Bms when constipated will see brb on stool.   Walks would bring on sudden urge to use bowel movement; start with constipated stool then explosive BM.  Bloating and pain most of the time resolved after Bm some times would linger.  Recalls when child didn't tolerate milk  with reflux.  Decided  to stop dairy and all sx resolved.  Bms currently tarry due to iron, but previously were not.  3 FOBT tests negative in .  Soreness in throat often for one month. No dysphagia /odynophagia.  Bms no straining , complete evacuation, every 2 -3 days then multiple movements in a day.  Pt notes no red meat in diet, but does consume other sources of protein.  Has seen nutrition and avoiding dairy as able.    2023-iron 63, TIBC 463, % sat 14, ferritin 19  Hgb 11.8, MCV 83      TSH wnl 2024    Colonoscopy 2020-normal , internal hemorrhoids, normal ileum    Unknown family history- parents  as infant    Soc:  No tob/etoh/illicits      Review of Systems  Review of Systems   Constitutional: Negative.    Eyes:  Positive for visual disturbance.   Respiratory:  Positive for cough and shortness of breath.    Cardiovascular:  Positive for chest pain.   Genitourinary: Negative.    Musculoskeletal:  Positive for arthralgias.   Neurological:  Positive for dizziness and headaches.       Past Medical History  Past Medical History:   Diagnosis Date    Acute pharyngitis, unspecified 10/15/2015    Acute infective pharyngitis    Encounter for gynecological examination (general) (routine) without abnormal findings 2018    Visit for gynecologic examination    Encounter for other screening for malignant neoplasm of breast 2018     Encounter for other screening for malignant neoplasm of breast    Encounter for screening for malignant neoplasm of cervix 02/14/2018    Cervical cancer screening    Encounter for screening for other viral diseases 01/03/2018    Need for hepatitis C screening test    Low back pain, unspecified 10/04/2016    Acute right-sided low back pain without sciatica    Other conditions influencing health status     Mammogram    Other conditions influencing health status     Reported Positive Pap Smear    Other conditions influencing health status     Colonoscopy (Fiberoptic)    Personal history of other diseases of the respiratory system 10/15/2015    History of acute pharyngitis    Personal history of other endocrine, nutritional and metabolic disease     History of hypothyroidism    Personal history of other endocrine, nutritional and metabolic disease     History of hyperlipidemia    Personal history of other endocrine, nutritional and metabolic disease     History of diabetes mellitus    Personal history of other infectious and parasitic diseases     History of herpes zoster    Personal history of other medical treatment 02/14/2018    History of screening mammography    Rash and other nonspecific skin eruption 10/04/2016    Rash of body    Urinary tract infection, site not specified 07/14/2015    Acute UTI    Urinary tract infection, site not specified 08/23/2018    Acute UTI       Social History  Social History     Tobacco Use    Smoking status: Never    Smokeless tobacco: Never   Substance Use Topics    Alcohol use: Yes     Comment: OCCASIONAL    Drug use: Never       Family History  Family History   Problem Relation Name Age of Onset    No Known Problems Mother      No Known Problems Father          Allergies  Allergies   Allergen Reactions    Adhesive Tape-Silicones Unknown     plastic bandaids cause rash, cloth ok       Medications  Current Outpatient Medications   Medication Instructions    blood sugar diagnostic  (OneTouch Verio test strips) strip USE TO TEST ONCE DAILY    CALCIUM CITRATE-VITAMIN D3 ORAL oral    cholecalciferol (Vitamin D-3) 25 MCG (1000 UT) capsule 1 capsule, oral, 2 times daily    cyanocobalamin (VITAMIN B-12) 1,000 mcg, oral, Daily    ferrous sulfate (325 mg ferrous sulfate) 325 mg, oral, Daily with breakfast    levothyroxine (SYNTHROID, LEVOXYL) 50 mcg, oral, Daily    Livalo 2 mg, oral, Daily    metFORMIN (GLUCOPHAGE) 1,000 mg, oral, 2 times daily    multivitamin tablet 1 tablet, oral, Daily with breakfast    ONETOUCH DELICA LANCETS MISC Daily    OneTouch Ultra Test strip TEST DAILY    prednisoLONE acetate (Pred-Forte) 1 % ophthalmic suspension 1 drop, Both Eyes, As directed.        Objective   There were no vitals taken for this visit.   Physical Exam  Vitals reviewed.   Constitutional:       General: She is awake.   Cardiovascular:      Rate and Rhythm: Normal rate and regular rhythm.   Pulmonary:      Effort: Pulmonary effort is normal.      Breath sounds: Normal breath sounds.   Abdominal:      General: Bowel sounds are normal.      Palpations: Abdomen is soft.      Tenderness: There is no abdominal tenderness.   Neurological:      Mental Status: She is alert and oriented to person, place, and time.   Psychiatric:         Attention and Perception: Attention and perception normal.         Behavior: Behavior normal.           Assessment/Plan     75yo with DL, Dm, hypothyroidism seen for abdominal pain and bloating, anemia. Abd bloating, pain, irregular bowel movements due to lactose intolerance which have resolved off dairy.  Underlying constipation with infrequent boewl movements and intermittent rectal bleeding. Recommend starting miralax daily .  Mild iron deficiency anemia which has improved with iron supplement. Suspect due to dietary deficiency. Will check EGD/colon to ensure no GI sources of blood loss.    Rec  Start miralax daily  Avoid dairy or use lactaid  Schedule egd/colonoscopy          Rebecca Sanches MD

## 2024-08-29 DIAGNOSIS — Z12.11 COLON CANCER SCREENING: ICD-10-CM

## 2024-08-29 RX ORDER — POLYETHYLENE GLYCOL 3350, SODIUM SULFATE ANHYDROUS, SODIUM BICARBONATE, SODIUM CHLORIDE, POTASSIUM CHLORIDE 236; 22.74; 6.74; 5.86; 2.97 G/4L; G/4L; G/4L; G/4L; G/4L
POWDER, FOR SOLUTION ORAL
Qty: 4000 ML | Refills: 0 | Status: SHIPPED | OUTPATIENT
Start: 2024-08-29

## 2024-09-30 ENCOUNTER — APPOINTMENT (OUTPATIENT)
Dept: CARDIOLOGY | Facility: CLINIC | Age: 74
End: 2024-09-30
Payer: MEDICARE

## 2024-10-04 ENCOUNTER — APPOINTMENT (OUTPATIENT)
Dept: ENDOCRINOLOGY | Facility: HOSPITAL | Age: 74
End: 2024-10-04
Payer: MEDICARE

## 2024-10-07 ENCOUNTER — APPOINTMENT (OUTPATIENT)
Dept: GASTROENTEROLOGY | Facility: EXTERNAL LOCATION | Age: 74
End: 2024-10-07
Payer: MEDICARE

## 2024-10-17 ENCOUNTER — LAB REQUISITION (OUTPATIENT)
Dept: LAB | Facility: HOSPITAL | Age: 74
End: 2024-10-17
Payer: MEDICARE

## 2024-10-17 ENCOUNTER — APPOINTMENT (OUTPATIENT)
Dept: GASTROENTEROLOGY | Facility: EXTERNAL LOCATION | Age: 74
End: 2024-10-17
Payer: MEDICARE

## 2024-10-17 DIAGNOSIS — D50.9 IRON DEFICIENCY ANEMIA, UNSPECIFIED IRON DEFICIENCY ANEMIA TYPE: ICD-10-CM

## 2024-10-17 DIAGNOSIS — K64.8 INTERNAL HEMORRHOIDS: Primary | ICD-10-CM

## 2024-10-17 PROCEDURE — 45378 DIAGNOSTIC COLONOSCOPY: CPT | Performed by: INTERNAL MEDICINE

## 2024-10-17 PROCEDURE — 88305 TISSUE EXAM BY PATHOLOGIST: CPT | Performed by: PATHOLOGY

## 2024-10-17 PROCEDURE — 88305 TISSUE EXAM BY PATHOLOGIST: CPT

## 2024-10-17 PROCEDURE — 43239 EGD BIOPSY SINGLE/MULTIPLE: CPT | Performed by: INTERNAL MEDICINE

## 2024-10-17 PROCEDURE — 87900 PHENOTYPE INFECT AGENT DRUG: CPT

## 2024-10-17 PROCEDURE — 3061F NEG MICROALBUMINURIA REV: CPT | Performed by: INTERNAL MEDICINE

## 2024-10-17 PROCEDURE — 0753T DGTZ GLS MCRSCP SLD LEVEL IV: CPT

## 2024-10-17 PROCEDURE — 3048F LDL-C <100 MG/DL: CPT | Performed by: INTERNAL MEDICINE

## 2024-10-17 PROCEDURE — 3044F HG A1C LEVEL LT 7.0%: CPT | Performed by: INTERNAL MEDICINE

## 2024-10-24 LAB
LAB AP ASR DISCLAIMER: NORMAL
LABORATORY COMMENT REPORT: NORMAL
PATH REPORT.FINAL DX SPEC: NORMAL
PATH REPORT.GROSS SPEC: NORMAL
PATH REPORT.RELEVANT HX SPEC: NORMAL
PATH REPORT.TOTAL CANCER: NORMAL

## 2024-10-30 LAB
ELECTRONICALLY SIGNED BY: NORMAL
H. PYLORI DRUG SUSCEPTIBILITY RESULTS: NORMAL

## 2024-11-06 DIAGNOSIS — B96.81 HELICOBACTER PYLORI GASTRITIS: Primary | ICD-10-CM

## 2024-11-06 DIAGNOSIS — K29.70 HELICOBACTER PYLORI GASTRITIS: Primary | ICD-10-CM

## 2024-11-06 RX ORDER — BISMUTH SUBSALICYLATE 262 MG/1
524 TABLET ORAL
Qty: 112 TABLET | Refills: 0 | Status: SHIPPED | OUTPATIENT
Start: 2024-11-06 | End: 2024-11-20

## 2024-11-06 RX ORDER — METRONIDAZOLE 500 MG/1
500 TABLET ORAL 4 TIMES DAILY
Qty: 56 TABLET | Refills: 0 | Status: SHIPPED | OUTPATIENT
Start: 2024-11-06 | End: 2024-11-20

## 2024-11-06 RX ORDER — PANTOPRAZOLE SODIUM 40 MG/1
40 TABLET, DELAYED RELEASE ORAL 2 TIMES DAILY
Qty: 28 TABLET | Refills: 0 | Status: SHIPPED | OUTPATIENT
Start: 2024-11-06 | End: 2024-11-20

## 2024-11-06 RX ORDER — TETRACYCLINE HYDROCHLORIDE 500 MG/1
500 CAPSULE ORAL 4 TIMES DAILY
Qty: 56 CAPSULE | Refills: 0 | Status: SHIPPED | OUTPATIENT
Start: 2024-11-06 | End: 2024-11-20

## 2024-11-14 ENCOUNTER — TELEPHONE (OUTPATIENT)
Dept: GASTROENTEROLOGY | Facility: CLINIC | Age: 74
End: 2024-11-14
Payer: MEDICARE

## 2024-11-14 NOTE — TELEPHONE ENCOUNTER
Pt called apologizing that she missed your call last week she was out of town. But since she started taking the medications she has been to the ER for high BP and sore swollen throat and chills, she said it all checked out ok.  She feels she is having a reaction to one of the medications but not sure what one it is.  She is wanting to know what she should do?

## 2024-11-19 ENCOUNTER — OFFICE VISIT (OUTPATIENT)
Dept: PRIMARY CARE | Facility: CLINIC | Age: 74
End: 2024-11-19
Payer: MEDICARE

## 2024-11-19 VITALS
OXYGEN SATURATION: 99 % | RESPIRATION RATE: 14 BRPM | WEIGHT: 139 LBS | SYSTOLIC BLOOD PRESSURE: 124 MMHG | HEART RATE: 70 BPM | DIASTOLIC BLOOD PRESSURE: 80 MMHG | HEIGHT: 64 IN | BODY MASS INDEX: 23.73 KG/M2

## 2024-11-19 DIAGNOSIS — D64.9 ANEMIA, UNSPECIFIED TYPE: ICD-10-CM

## 2024-11-19 DIAGNOSIS — D50.0 IRON DEFICIENCY ANEMIA DUE TO CHRONIC BLOOD LOSS: ICD-10-CM

## 2024-11-19 DIAGNOSIS — R19.7 DIARRHEA, UNSPECIFIED TYPE: ICD-10-CM

## 2024-11-19 DIAGNOSIS — R53.81 MALAISE AND FATIGUE: ICD-10-CM

## 2024-11-19 DIAGNOSIS — R53.83 MALAISE AND FATIGUE: ICD-10-CM

## 2024-11-19 DIAGNOSIS — A04.8 H. PYLORI INFECTION: ICD-10-CM

## 2024-11-19 DIAGNOSIS — T88.7XXA MEDICATION SIDE EFFECT: Primary | ICD-10-CM

## 2024-11-19 PROCEDURE — 99214 OFFICE O/P EST MOD 30 MIN: CPT | Performed by: INTERNAL MEDICINE

## 2024-11-19 PROCEDURE — 3008F BODY MASS INDEX DOCD: CPT | Performed by: INTERNAL MEDICINE

## 2024-11-19 PROCEDURE — 3079F DIAST BP 80-89 MM HG: CPT | Performed by: INTERNAL MEDICINE

## 2024-11-19 PROCEDURE — 3048F LDL-C <100 MG/DL: CPT | Performed by: INTERNAL MEDICINE

## 2024-11-19 PROCEDURE — 3074F SYST BP LT 130 MM HG: CPT | Performed by: INTERNAL MEDICINE

## 2024-11-19 PROCEDURE — 3061F NEG MICROALBUMINURIA REV: CPT | Performed by: INTERNAL MEDICINE

## 2024-11-19 PROCEDURE — 1123F ACP DISCUSS/DSCN MKR DOCD: CPT | Performed by: INTERNAL MEDICINE

## 2024-11-19 PROCEDURE — 3044F HG A1C LEVEL LT 7.0%: CPT | Performed by: INTERNAL MEDICINE

## 2024-11-19 ASSESSMENT — ENCOUNTER SYMPTOMS
FATIGUE: 1
VOMITING: 0
CONSTIPATION: 0
CHILLS: 0
MYALGIAS: 1
ROS GI COMMENTS: PER HPI
NAUSEA: 0
WEAKNESS: 1
JOINT SWELLING: 0
FEVER: 0
DIARRHEA: 0
SHORTNESS OF BREATH: 0
COUGH: 0
DIAPHORESIS: 0

## 2024-11-19 NOTE — PROGRESS NOTES
"Subjective   Sylvia Ramos is a 74 y.o. female who presents for  er FOLLOW UP  HAD UPPER GI LAST MONTH FOUND OUT SHE HAS H PYLORI STARTED TREATMENT   DRINKS LOTS OF WATER WITH ATBS   SAYS BP HAS BEEN ELEVATED   EDEMA IN ANKLES 'S /90S  AFTER HRS TOLD HER TO GO TO ER 11/12  FOGGY CONFUSED SORE THROAT COUGH RAPID STREP NEGATIVE   STARTED TO FEEL FOGGY SUNDAY AGAIN FOGGY COULDN'T FINISH CROSSWORD PUZZLE    CALLED GI WAS CONCERNED SE ADVISED CALL PCP   BP IS BACK TO NORMAL EDEMA ALMOST GONE   STILL HAS HOARSE VOICE PEPTO MAKES WORSE     HPI   SPREAD OUT TETRACYCLINE AND METRONIDAZOLE TO EVERY 6 HOURS.      GLUCOSE TODAY 122    HAS HAD FREQUENT AND FULL URINATION BECAUSE BEEN DRINKING A LOT OF WATER TO HELP WITH THE MEDICATIONS    WHEN WAS FEELING SO SICK HAD ELEVATED BLOOD PRESSURE, AND LEG SWELLING    HAS SINCE RESOLVED    CONFUSION AND FOGGINESS WAS PROMINENT, BUT SEEMS     IRON DEFICIENCY LED TO EGD, LED TO H PYLORII, LED TO ABX REGIMEN THAT MADE HER ILL  HAS COUGH COLD WITH SCANT PHLEGM GETTING CAUGHT IN THROAT, NO FEVER/CHILL/N/V    Review of Systems   Constitutional:  Positive for fatigue. Negative for chills, diaphoresis and fever.   Respiratory:  Negative for cough and shortness of breath.    Cardiovascular:  Negative for chest pain and leg swelling.   Gastrointestinal:  Negative for constipation, diarrhea, nausea and vomiting.        PER HPI   Musculoskeletal:  Positive for myalgias. Negative for joint swelling.   Neurological:  Positive for weakness.       Objective   /80   Pulse 70   Resp 14   Ht 1.626 m (5' 4\")   Wt 63 kg (139 lb)   SpO2 99%   BMI 23.86 kg/m²     Physical Exam  Vitals reviewed.   Constitutional:       General: She is not in acute distress.     Appearance: She is not ill-appearing.   Cardiovascular:      Rate and Rhythm: Normal rate and regular rhythm.      Pulses: Normal pulses.      Heart sounds:      No gallop.   Pulmonary:      Effort: Pulmonary effort is normal. No " respiratory distress.      Breath sounds: Normal breath sounds. No wheezing, rhonchi or rales.   Abdominal:      General: Abdomen is flat. Bowel sounds are normal.      Palpations: Abdomen is soft.      Tenderness: There is no guarding or rebound.   Musculoskeletal:      Right lower leg: No edema.      Left lower leg: No edema.      Comments: NO EDEMA WHATSOEVER   Skin:     General: Skin is warm and dry.         Assessment/Plan   Problem List Items Addressed This Visit       Medication side effect - Primary    H. pylori infection    Iron deficiency anemia due to chronic blood loss     Other Visit Diagnoses       Malaise and fatigue        Relevant Orders    Comprehensive Metabolic Panel    CBC    Magnesium    Diarrhea, unspecified type        Relevant Orders    Comprehensive Metabolic Panel    CBC    Magnesium    Anemia, unspecified type        Relevant Orders    Comprehensive Metabolic Panel    CBC    Magnesium          Patient Instructions    AS WE DISCUSSED, ALMOST ALL PEOPLE THAT TAKE THE ANTIBIOTIC REGIMEN FOR H. PYLORII INFECTION HAVE DIFFICULTY WITH IT IN MANY WAYS, LIKE YOU ARE HAVING WITH T HIS.    2.  RECOMMEND THAT YOU COMPLETE THE COURSE OF ANTIBIOTICS TO GIVE YOURSELF THE BEST CHANCE OF SUCCESSFUL ERADICATION OF THE INFECTION.    3.  RECOMMEND YOU DRINK FLUIDS IN RESPONSE TO THIRST AND TO SHOOT FOR CLEAR LIGHT YELLOW URINE    4.  THERE IS NO EVIDENCE FOR VOLUME OVERLOAD ON EXAM, SO I DON'T THINK YOUR HEART IS BEING AFFECTED BY THE ANTIBIOTIC REGIMEN.    5.  OK FOR PLAIN EXPECTORANT ROBITUSSIN OR MUCINEX ( NO-D)TO THIN OUT MUCUS SO YOU CAN COUGH OUT THE PHLEGM FROM YOUR COLD    6.  YOU SHOULD START FEELING BETTER A FEW DAYS AFTER FINISHING THE COURSE OF ANTIBIOTICS    7.  NON-FASTING LABS ARE ORDERED TO CHECK YOU FUNCTIONS AND ELECTROLYTES.    8.  FOLLOW UP AS SCHEDULED OR IF NO BETTER.

## 2024-11-19 NOTE — PATIENT INSTRUCTIONS
AS WE DISCUSSED, ALMOST ALL PEOPLE THAT TAKE THE ANTIBIOTIC REGIMEN FOR H. PYLORII INFECTION HAVE DIFFICULTY WITH IT IN MANY WAYS, LIKE YOU ARE HAVING WITH T HIS.    2.  RECOMMEND THAT YOU COMPLETE THE COURSE OF ANTIBIOTICS TO GIVE YOURSELF THE BEST CHANCE OF SUCCESSFUL ERADICATION OF THE INFECTION.    3.  RECOMMEND YOU DRINK FLUIDS IN RESPONSE TO THIRST AND TO SHOOT FOR CLEAR LIGHT YELLOW URINE    4.  THERE IS NO EVIDENCE FOR VOLUME OVERLOAD ON EXAM, SO I DON'T THINK YOUR HEART IS BEING AFFECTED BY THE ANTIBIOTIC REGIMEN.    5.  OK FOR PLAIN EXPECTORANT ROBITUSSIN OR MUCINEX ( NO-D)TO THIN OUT MUCUS SO YOU CAN COUGH OUT THE PHLEGM FROM YOUR COLD    6.  YOU SHOULD START FEELING BETTER A FEW DAYS AFTER FINISHING THE COURSE OF ANTIBIOTICS    7.  NON-FASTING LABS ARE ORDERED TO CHECK YOU FUNCTIONS AND ELECTROLYTES.    8.  FOLLOW UP AS SCHEDULED OR IF NO BETTER.

## 2024-11-20 ENCOUNTER — APPOINTMENT (OUTPATIENT)
Dept: ENDOCRINOLOGY | Facility: CLINIC | Age: 74
End: 2024-11-20
Payer: MEDICARE

## 2024-11-20 ENCOUNTER — LAB (OUTPATIENT)
Dept: LAB | Facility: LAB | Age: 74
End: 2024-11-20
Payer: MEDICARE

## 2024-11-20 VITALS
HEIGHT: 64 IN | HEART RATE: 70 BPM | SYSTOLIC BLOOD PRESSURE: 145 MMHG | BODY MASS INDEX: 23.22 KG/M2 | WEIGHT: 136 LBS | DIASTOLIC BLOOD PRESSURE: 83 MMHG

## 2024-11-20 DIAGNOSIS — D64.9 ANEMIA, UNSPECIFIED TYPE: ICD-10-CM

## 2024-11-20 DIAGNOSIS — E03.9 HYPOTHYROIDISM, UNSPECIFIED TYPE: Primary | ICD-10-CM

## 2024-11-20 DIAGNOSIS — R19.7 DIARRHEA, UNSPECIFIED TYPE: ICD-10-CM

## 2024-11-20 DIAGNOSIS — R53.83 MALAISE AND FATIGUE: ICD-10-CM

## 2024-11-20 DIAGNOSIS — R53.81 MALAISE AND FATIGUE: ICD-10-CM

## 2024-11-20 LAB
ALBUMIN SERPL BCP-MCNC: 4.2 G/DL (ref 3.4–5)
ALP SERPL-CCNC: 44 U/L (ref 33–136)
ALT SERPL W P-5'-P-CCNC: 23 U/L (ref 7–45)
ANION GAP SERPL CALC-SCNC: 12 MMOL/L (ref 10–20)
AST SERPL W P-5'-P-CCNC: 44 U/L (ref 9–39)
BILIRUB SERPL-MCNC: 0.6 MG/DL (ref 0–1.2)
BUN SERPL-MCNC: 10 MG/DL (ref 6–23)
CALCIUM SERPL-MCNC: 8.7 MG/DL (ref 8.6–10.6)
CHLORIDE SERPL-SCNC: 93 MMOL/L (ref 98–107)
CO2 SERPL-SCNC: 27 MMOL/L (ref 21–32)
CREAT SERPL-MCNC: 0.78 MG/DL (ref 0.5–1.05)
EGFRCR SERPLBLD CKD-EPI 2021: 80 ML/MIN/1.73M*2
ERYTHROCYTE [DISTWIDTH] IN BLOOD BY AUTOMATED COUNT: 14.2 % (ref 11.5–14.5)
GLUCOSE SERPL-MCNC: 91 MG/DL (ref 74–99)
HCT VFR BLD AUTO: 38.8 % (ref 36–46)
HGB BLD-MCNC: 12.3 G/DL (ref 12–16)
MAGNESIUM SERPL-MCNC: 1.69 MG/DL (ref 1.6–2.4)
MCH RBC QN AUTO: 25.4 PG (ref 26–34)
MCHC RBC AUTO-ENTMCNC: 31.7 G/DL (ref 32–36)
MCV RBC AUTO: 80 FL (ref 80–100)
NRBC BLD-RTO: 0 /100 WBCS (ref 0–0)
PLATELET # BLD AUTO: 283 X10*3/UL (ref 150–450)
POTASSIUM SERPL-SCNC: 4.4 MMOL/L (ref 3.5–5.3)
PROT SERPL-MCNC: 6.6 G/DL (ref 6.4–8.2)
RBC # BLD AUTO: 4.85 X10*6/UL (ref 4–5.2)
SODIUM SERPL-SCNC: 128 MMOL/L (ref 136–145)
WBC # BLD AUTO: 6.9 X10*3/UL (ref 4.4–11.3)

## 2024-11-20 PROCEDURE — 3048F LDL-C <100 MG/DL: CPT | Performed by: STUDENT IN AN ORGANIZED HEALTH CARE EDUCATION/TRAINING PROGRAM

## 2024-11-20 PROCEDURE — 3077F SYST BP >= 140 MM HG: CPT | Performed by: STUDENT IN AN ORGANIZED HEALTH CARE EDUCATION/TRAINING PROGRAM

## 2024-11-20 PROCEDURE — 99214 OFFICE O/P EST MOD 30 MIN: CPT | Performed by: STUDENT IN AN ORGANIZED HEALTH CARE EDUCATION/TRAINING PROGRAM

## 2024-11-20 PROCEDURE — 3061F NEG MICROALBUMINURIA REV: CPT | Performed by: STUDENT IN AN ORGANIZED HEALTH CARE EDUCATION/TRAINING PROGRAM

## 2024-11-20 PROCEDURE — 3079F DIAST BP 80-89 MM HG: CPT | Performed by: STUDENT IN AN ORGANIZED HEALTH CARE EDUCATION/TRAINING PROGRAM

## 2024-11-20 PROCEDURE — 85027 COMPLETE CBC AUTOMATED: CPT

## 2024-11-20 PROCEDURE — 36415 COLL VENOUS BLD VENIPUNCTURE: CPT

## 2024-11-20 PROCEDURE — 80053 COMPREHEN METABOLIC PANEL: CPT

## 2024-11-20 PROCEDURE — 3044F HG A1C LEVEL LT 7.0%: CPT | Performed by: STUDENT IN AN ORGANIZED HEALTH CARE EDUCATION/TRAINING PROGRAM

## 2024-11-20 PROCEDURE — 1126F AMNT PAIN NOTED NONE PRSNT: CPT | Performed by: STUDENT IN AN ORGANIZED HEALTH CARE EDUCATION/TRAINING PROGRAM

## 2024-11-20 PROCEDURE — 83735 ASSAY OF MAGNESIUM: CPT

## 2024-11-20 PROCEDURE — 3008F BODY MASS INDEX DOCD: CPT | Performed by: STUDENT IN AN ORGANIZED HEALTH CARE EDUCATION/TRAINING PROGRAM

## 2024-11-20 PROCEDURE — 1123F ACP DISCUSS/DSCN MKR DOCD: CPT | Performed by: STUDENT IN AN ORGANIZED HEALTH CARE EDUCATION/TRAINING PROGRAM

## 2024-11-20 ASSESSMENT — PAIN SCALES - GENERAL: PAINLEVEL_OUTOF10: 0-NO PAIN

## 2024-11-20 NOTE — PROGRESS NOTES
Sylvia Ramos is a 74 y.o. female who presents for follow up for Type 2 diabetes mellitus and hypothyroidism      Lab Results   Component Value Date    HGBA1C 6.9 (H) 2024   In office A1c: 6.8%   Lab Results   Component Value Date    TSH 2.90 2024      Date of diagnosis: ~10 years. Date of last HbA1c: 2023 and results: 6.8%.     Interval History:   Last clinic visit: 2023 , med kept the same  Recently dx with H Pyloric , today is last day of her 14 day course treatment , feels bad due to the GI SE, also has cold.  Otherwise feeling well. Developed worsening anemia -> started iron supplementation (in the evening).    Current meds:  Metformin 1000 mg BID in the morning and bed time and Livalo 2 mg  Levothyroxine 50 mg daily takes it appropriately , at 5 AM  Taking vitamin B 12 sublingual 1000 mcg       Home Glucose Monitoring:   Fingerstick Glucose Ranges: In am: 110s (for the past 2-3 days: 120s)  Recent hypoglycemic episodes: - , in the past 14 days lowest: 93 after lunch (had  PO intake due to feeling sick)    Diet Plan:   Eats small meals frequently: 3 /day, 2 snacks in between  Breakfast big meal with protein + vegetables, cereal, half a banana, at Lunch : Soup with a salad and then tea with a cookie. Dinner between 6-8  If she feels lightheaded before bed has a peanut butter cracker. When she eats dinner early  Diabetes Surveillance: Eye exam: 2024 at [facility]. Had cataract in September in   Diabetes Complications:   Opthalmic: Has not been found to have any eye complications   Cardiovascular: no coronary artery bypass graft and no coronary artery disease . On Livazo.   Renal: ACR: 51.8 in 2021  Neurologic: peripheral neuropathy      Review of Systems:   Has constipation (started on fiber powder), no nausea, in the past 2 weeks: + polydipsia and + polyuria, no change in vision, no weight gain and no weight loss   Fatigued and has been having shortness of breath and  "tachycardia , and CP with effort, relieved at erst (has an cal with cardio in Dec),thinning of the hair, dry skin , hoarseness, cold intolerance, muscles cramps (in the past 2 weeks only)  BP at home: 110/70s.    All pertinent systems reviewed and are otherwise negative     Current Outpatient Medications on File Prior to Visit   Medication Sig Dispense Refill    bismuth subsalicylate (Pepto Bismol) 262 mg chewable tablet Chew 2 tablets (524 mg) 4 times a day before meals for 14 days. 112 tablet 0    CALCIUM CITRATE-VITAMIN D3 ORAL Take by mouth.      cholecalciferol (Vitamin D-3) 25 MCG (1000 UT) capsule Take 1 capsule (25 mcg) by mouth twice a day.      cyanocobalamin (Vitamin B-12) 1,000 mcg tablet Take 1 tablet (1,000 mcg) by mouth once daily.      ferrous sulfate, 325 mg ferrous sulfate, tablet Take 1 tablet by mouth once daily with breakfast.      levothyroxine (Synthroid, Levoxyl) 50 mcg tablet TAKE 1 TABLET BY MOUTH DAILY 90 tablet 3    Livalo 2 mg tablet Take 2 mg by mouth once daily. 90 tablet 3    metFORMIN (Glucophage) 1,000 mg tablet TAKE 1 TABLET(1000 MG) BY MOUTH TWICE DAILY 180 tablet 3    metroNIDAZOLE (Flagyl) 500 mg tablet Take 1 tablet (500 mg) by mouth 4 times a day for 14 days. 56 tablet 0    multivitamin tablet Take 1 tablet by mouth once daily with a meal.      pantoprazole (ProtoNix) 40 mg EC tablet Take 1 tablet (40 mg) by mouth 2 times a day for 14 days. Do not crush, chew, or split. 28 tablet 0    tetracycline 500 mg capsule Take 1 capsule (500 mg) by mouth 4 times a day for 14 days. 56 capsule 0    [DISCONTINUED] polyethylene glycol (Golytely) 236-22.74-6.74 -5.86 gram solution STARTING AT 6PM DRINK HALF OF THE BOTTLE, DRINK THE OTHER HALF 5 HRS BEFORE PROCEDURE TIME 4000 mL 0     No current facility-administered medications on file prior to visit.          Objective  Visit Vitals  /83   Pulse 70   Ht 1.626 m (5' 4\")   Wt 61.7 kg (136 lb)   BMI 23.34 kg/m²   OB Status Unknown "   Smoking Status Never   BSA 1.67 m²        Physical Exam  Constitutional: NAD, AOx3.  Skin/Hair: Warm, dry skin, thin hair  HEENT: EOMI, Anicteric scleras, No lid lag or lid retraction, No Chemosis, No TTP of ocular globes. Dry oral mucosa. Chvostek sign -  Neck: Soft, supple. Non tender, full ROM, no lymphadenopathy. Thyroid gland palpation: non nodular, soft consistency, non tender, no bruit.   Cardiovascular: Normal s1s2, RRR, no rub or murmurs.  Respiratory: CTAB, GBAE, no wheezes  Abdomen: +BS, Soft, non-tender to palpation.  Extremities: Preserved peripheral pulses, No peripheral edema, No tremors  Neuro: Moving all extremities spontaneously. CN's grossly intact. No balance or gait disturbances. DTRs: mild delayed relaxation in DTRs.           Lab Review      Latest Reference Range & Units 04/09/24 10:28   Hemoglobin A1C see below % 6.9 (H)   Thyroid Stimulating Hormone 0.44 - 3.98 mIU/L 2.90   Vitamin D, 25-Hydroxy, Total 30 - 100 ng/mL 24 (L)   GLUCOSE 74 - 99 mg/dL 119 (H)   Estimated Average Glucose Not Established mg/dL 151   (H): Data is abnormally high  (L): Data is abnormally low         Assessment/Plan  Mrs. Ramos is a 74 year old F with T2DM and hypothyroidism coming in for follow up.  date of diagnosis: ~10 years.     Date of last HbA1c: April 2024 : 6.9%. In office 11/20: 6.8%    Plan:  Continue Metformin 1000 mg BID with meals  Continue livalo  Continue to follow with ophthalmology  Check BG 2-3 times weekly  When lightheaded check BG  Continue levothyroxine 50 mcg daily  to be taken on an empty stomach on its own 30min before other meds or food , 3-4 h away from calcium . Iron pills, and ~ h away from PPI  -patient will contact the clinics, in case she comes off PPI, so we can check TSH and Ft4 in 4-6 weeks    RTC if needed , can be managed by PCP

## 2024-11-25 DIAGNOSIS — E87.1 HYPONATREMIA: Primary | ICD-10-CM

## 2024-12-02 DIAGNOSIS — E03.9 HYPOTHYROIDISM, UNSPECIFIED TYPE: ICD-10-CM

## 2024-12-02 RX ORDER — LEVOTHYROXINE SODIUM 50 UG/1
50 TABLET ORAL DAILY
Qty: 90 TABLET | Refills: 3 | Status: SHIPPED | OUTPATIENT
Start: 2024-12-02 | End: 2024-12-04 | Stop reason: SDUPTHER

## 2024-12-04 ENCOUNTER — APPOINTMENT (OUTPATIENT)
Dept: PRIMARY CARE | Facility: CLINIC | Age: 74
End: 2024-12-04
Payer: MEDICARE

## 2024-12-04 VITALS
RESPIRATION RATE: 14 BRPM | SYSTOLIC BLOOD PRESSURE: 130 MMHG | DIASTOLIC BLOOD PRESSURE: 86 MMHG | HEIGHT: 64 IN | WEIGHT: 137 LBS | OXYGEN SATURATION: 100 % | HEART RATE: 73 BPM | BODY MASS INDEX: 23.39 KG/M2

## 2024-12-04 DIAGNOSIS — D50.0 IRON DEFICIENCY ANEMIA DUE TO CHRONIC BLOOD LOSS: ICD-10-CM

## 2024-12-04 DIAGNOSIS — T88.7XXA MEDICATION SIDE EFFECT: ICD-10-CM

## 2024-12-04 DIAGNOSIS — A04.8 H. PYLORI INFECTION: Primary | ICD-10-CM

## 2024-12-04 DIAGNOSIS — E11.9 TYPE 2 DIABETES MELLITUS WITHOUT COMPLICATION, WITHOUT LONG-TERM CURRENT USE OF INSULIN (MULTI): ICD-10-CM

## 2024-12-04 DIAGNOSIS — E03.9 HYPOTHYROIDISM, UNSPECIFIED TYPE: ICD-10-CM

## 2024-12-04 DIAGNOSIS — E78.00 HYPERCHOLESTEROLEMIA: ICD-10-CM

## 2024-12-04 DIAGNOSIS — E87.1 HYPONATREMIA: ICD-10-CM

## 2024-12-04 DIAGNOSIS — E11.9 CONTROLLED TYPE 2 DIABETES MELLITUS WITHOUT COMPLICATION, WITHOUT LONG-TERM CURRENT USE OF INSULIN (MULTI): ICD-10-CM

## 2024-12-04 PROCEDURE — 1123F ACP DISCUSS/DSCN MKR DOCD: CPT | Performed by: INTERNAL MEDICINE

## 2024-12-04 PROCEDURE — 3044F HG A1C LEVEL LT 7.0%: CPT | Performed by: INTERNAL MEDICINE

## 2024-12-04 PROCEDURE — 1159F MED LIST DOCD IN RCRD: CPT | Performed by: INTERNAL MEDICINE

## 2024-12-04 PROCEDURE — 3048F LDL-C <100 MG/DL: CPT | Performed by: INTERNAL MEDICINE

## 2024-12-04 PROCEDURE — 99213 OFFICE O/P EST LOW 20 MIN: CPT | Performed by: INTERNAL MEDICINE

## 2024-12-04 PROCEDURE — 3008F BODY MASS INDEX DOCD: CPT | Performed by: INTERNAL MEDICINE

## 2024-12-04 PROCEDURE — 3061F NEG MICROALBUMINURIA REV: CPT | Performed by: INTERNAL MEDICINE

## 2024-12-04 PROCEDURE — 3079F DIAST BP 80-89 MM HG: CPT | Performed by: INTERNAL MEDICINE

## 2024-12-04 PROCEDURE — 3075F SYST BP GE 130 - 139MM HG: CPT | Performed by: INTERNAL MEDICINE

## 2024-12-04 RX ORDER — LEVOTHYROXINE SODIUM 50 UG/1
50 TABLET ORAL DAILY
Qty: 90 TABLET | Refills: 3 | Status: SHIPPED | OUTPATIENT
Start: 2024-12-04

## 2024-12-04 RX ORDER — METFORMIN HYDROCHLORIDE 1000 MG/1
1000 TABLET ORAL 2 TIMES DAILY
Qty: 180 TABLET | Refills: 3 | Status: SHIPPED | OUTPATIENT
Start: 2024-12-04

## 2024-12-04 RX ORDER — PITAVASTATIN CALCIUM 2.09 MG/1
2 TABLET, FILM COATED ORAL DAILY
Qty: 90 TABLET | Refills: 3 | Status: SHIPPED | OUTPATIENT
Start: 2024-12-04

## 2024-12-04 ASSESSMENT — ENCOUNTER SYMPTOMS
MYALGIAS: 0
VOMITING: 0
CHILLS: 0
CONSTIPATION: 0
DIAPHORESIS: 0
NAUSEA: 0
DIARRHEA: 0
FEVER: 0
JOINT SWELLING: 0
WEAKNESS: 0
COUGH: 0
SHORTNESS OF BREATH: 0
FATIGUE: 0

## 2024-12-04 NOTE — PROGRESS NOTES
"Subjective   Sylvia Ramos is a 74 y.o. female who presents for FOLLOW UP PT SAYS SHE IS DUE FOR LABS PREFERS A1C IN LABS   SEEN ENDOCRINOLOGIST STABLE DOES NOT NEED TO SEE PT       HPI   FEEL MUCH BETTER SINCE ANTIBIOTICS ARE COMPLETED    HAS FOLLOW UP TEST TO DOCUMENT CLEARANCE OF H PYLORII IN A FEW MONTHS    URINE HAS CONCENTRATED AGAIN      Review of Systems   Constitutional:  Negative for chills, diaphoresis, fatigue and fever.   Respiratory:  Negative for cough and shortness of breath.    Cardiovascular:  Negative for chest pain and leg swelling.   Gastrointestinal:  Negative for constipation, diarrhea, nausea and vomiting.   Musculoskeletal:  Negative for joint swelling and myalgias.   Neurological:  Negative for weakness.       Objective   /86   Pulse 73   Resp 14   Ht 1.626 m (5' 4\")   Wt 62.1 kg (137 lb)   SpO2 100%   BMI 23.52 kg/m²     Physical Exam  Vitals reviewed.   Constitutional:       General: She is not in acute distress.     Appearance: She is not ill-appearing.   Cardiovascular:      Rate and Rhythm: Normal rate and regular rhythm.      Pulses: Normal pulses.      Heart sounds:      No gallop.   Pulmonary:      Effort: Pulmonary effort is normal. No respiratory distress.      Breath sounds: Normal breath sounds. No wheezing, rhonchi or rales.   Abdominal:      General: Abdomen is flat. Bowel sounds are normal.      Palpations: Abdomen is soft.      Tenderness: There is no guarding or rebound.   Musculoskeletal:      Right lower leg: No edema.      Left lower leg: No edema.      Comments: NO EDEMA WHATSOEVER   Skin:     General: Skin is warm and dry.         Assessment/Plan   Problem List Items Addressed This Visit       Diabetes mellitus (Multi)    Relevant Orders    Comprehensive Metabolic Panel    Hemoglobin A1C    Hypercholesterolemia    Relevant Medications    Livalo 2 mg tablet    Hypothyroidism    Relevant Medications    levothyroxine (Synthroid, Levoxyl) 50 mcg tablet    " Medication side effect    H. pylori infection - Primary    Iron deficiency anemia due to chronic blood loss     Other Visit Diagnoses       Hyponatremia        Relevant Orders    Comprehensive Metabolic Panel    Controlled type 2 diabetes mellitus without complication, without long-term current use of insulin (Multi)        Relevant Medications    metFORMIN (Glucophage) 1,000 mg tablet          Patient Instructions    LABS ARE ORDERED TO INCLUDE RECHECK SODIUM AND THE A1C, THE THYROID LABS CAN BE DONE AT THE SAME TIME    2.  YOU APPEAR TO BE RECOVERING NICELY FROM THE TRAUMA OF THE H PYLORII ANTIBIOTIC REGIMEN, THEN FACT THAT YOU WERE ABLE TO ENDURE AND FINISH THE TREATMENT GIVES VERY HIGH PROBABILITY FOR ERADICATION OF THE H PYLORII INFECTION    3.  I SENT IN REFILLS AS REQUESTED    4.  THE STOOL COLOR CHANGE IS LIKELY FROM IRON PILL, YOU CAN GOTO EVERY OTHER DAY ON IRON PILL AND TRY TO STAY ON IT FOR A FEW MONTHS.  WILL RECHECK IRON STORAGE IN THE SPRING    5.  FOLLOW UP 4 MONTHS OR AS NEEDED.

## 2024-12-04 NOTE — PATIENT INSTRUCTIONS
LABS ARE ORDERED TO INCLUDE RECHECK SODIUM AND THE A1C, THE THYROID LABS CAN BE DONE AT THE SAME TIME    2.  YOU APPEAR TO BE RECOVERING NICELY FROM THE TRAUMA OF THE H PYLORII ANTIBIOTIC REGIMEN, THEN FACT THAT YOU WERE ABLE TO ENDURE AND FINISH THE TREATMENT GIVES VERY HIGH PROBABILITY FOR ERADICATION OF THE H PYLORII INFECTION    3.  I SENT IN REFILLS AS REQUESTED    4.  THE STOOL COLOR CHANGE IS LIKELY FROM IRON PILL, YOU CAN GOTO EVERY OTHER DAY ON IRON PILL AND TRY TO STAY ON IT FOR A FEW MONTHS.  WILL RECHECK IRON STORAGE IN THE SPRING    5.  FOLLOW UP 4 MONTHS OR AS NEEDED.

## 2024-12-06 ENCOUNTER — LAB (OUTPATIENT)
Dept: LAB | Facility: LAB | Age: 74
End: 2024-12-06
Payer: MEDICARE

## 2024-12-06 DIAGNOSIS — E11.9 TYPE 2 DIABETES MELLITUS WITHOUT COMPLICATION, WITHOUT LONG-TERM CURRENT USE OF INSULIN (MULTI): ICD-10-CM

## 2024-12-06 DIAGNOSIS — E87.1 HYPONATREMIA: ICD-10-CM

## 2024-12-06 LAB
ALBUMIN SERPL BCP-MCNC: 4.1 G/DL (ref 3.4–5)
ALP SERPL-CCNC: 48 U/L (ref 33–136)
ALT SERPL W P-5'-P-CCNC: 14 U/L (ref 7–45)
ANION GAP SERPL CALC-SCNC: 10 MMOL/L (ref 10–20)
AST SERPL W P-5'-P-CCNC: 19 U/L (ref 9–39)
BILIRUB SERPL-MCNC: 0.3 MG/DL (ref 0–1.2)
BUN SERPL-MCNC: 17 MG/DL (ref 6–23)
CALCIUM SERPL-MCNC: 8.3 MG/DL (ref 8.6–10.3)
CHLORIDE SERPL-SCNC: 100 MMOL/L (ref 98–107)
CO2 SERPL-SCNC: 27 MMOL/L (ref 21–32)
CREAT SERPL-MCNC: 0.8 MG/DL (ref 0.5–1.05)
EGFRCR SERPLBLD CKD-EPI 2021: 77 ML/MIN/1.73M*2
GLUCOSE SERPL-MCNC: 142 MG/DL (ref 74–99)
POTASSIUM SERPL-SCNC: 3.9 MMOL/L (ref 3.5–5.3)
PROT SERPL-MCNC: 6.7 G/DL (ref 6.4–8.2)
SODIUM SERPL-SCNC: 133 MMOL/L (ref 136–145)

## 2024-12-06 PROCEDURE — 83930 ASSAY OF BLOOD OSMOLALITY: CPT

## 2024-12-06 PROCEDURE — 83036 HEMOGLOBIN GLYCOSYLATED A1C: CPT

## 2024-12-06 PROCEDURE — 36415 COLL VENOUS BLD VENIPUNCTURE: CPT

## 2024-12-06 PROCEDURE — 80053 COMPREHEN METABOLIC PANEL: CPT

## 2024-12-06 PROCEDURE — 83935 ASSAY OF URINE OSMOLALITY: CPT

## 2024-12-07 LAB
EST. AVERAGE GLUCOSE BLD GHB EST-MCNC: 143 MG/DL
HBA1C MFR BLD: 6.6 %
OSMOLALITY SERPL: 284 MOSM/KG (ref 280–300)
OSMOLALITY UR: 524 MOSM/KG (ref 200–1200)

## 2024-12-19 ENCOUNTER — APPOINTMENT (OUTPATIENT)
Dept: CARDIOLOGY | Facility: CLINIC | Age: 74
End: 2024-12-19
Payer: MEDICARE

## 2024-12-19 VITALS
HEART RATE: 88 BPM | HEIGHT: 64 IN | BODY MASS INDEX: 22.53 KG/M2 | WEIGHT: 132 LBS | OXYGEN SATURATION: 98 % | DIASTOLIC BLOOD PRESSURE: 80 MMHG | SYSTOLIC BLOOD PRESSURE: 130 MMHG

## 2024-12-19 DIAGNOSIS — E78.5 DYSLIPIDEMIA: ICD-10-CM

## 2024-12-19 DIAGNOSIS — R07.9 CHEST PAIN, UNSPECIFIED TYPE: Primary | ICD-10-CM

## 2024-12-19 PROCEDURE — 1036F TOBACCO NON-USER: CPT | Performed by: INTERNAL MEDICINE

## 2024-12-19 PROCEDURE — 3048F LDL-C <100 MG/DL: CPT | Performed by: INTERNAL MEDICINE

## 2024-12-19 PROCEDURE — 3075F SYST BP GE 130 - 139MM HG: CPT | Performed by: INTERNAL MEDICINE

## 2024-12-19 PROCEDURE — 1159F MED LIST DOCD IN RCRD: CPT | Performed by: INTERNAL MEDICINE

## 2024-12-19 PROCEDURE — 1123F ACP DISCUSS/DSCN MKR DOCD: CPT | Performed by: INTERNAL MEDICINE

## 2024-12-19 PROCEDURE — 99214 OFFICE O/P EST MOD 30 MIN: CPT | Mod: 25 | Performed by: INTERNAL MEDICINE

## 2024-12-19 PROCEDURE — 99214 OFFICE O/P EST MOD 30 MIN: CPT | Performed by: INTERNAL MEDICINE

## 2024-12-19 PROCEDURE — 3061F NEG MICROALBUMINURIA REV: CPT | Performed by: INTERNAL MEDICINE

## 2024-12-19 PROCEDURE — 93005 ELECTROCARDIOGRAM TRACING: CPT | Performed by: INTERNAL MEDICINE

## 2024-12-19 PROCEDURE — 3044F HG A1C LEVEL LT 7.0%: CPT | Performed by: INTERNAL MEDICINE

## 2024-12-19 PROCEDURE — 1160F RVW MEDS BY RX/DR IN RCRD: CPT | Performed by: INTERNAL MEDICINE

## 2024-12-19 PROCEDURE — 93010 ELECTROCARDIOGRAM REPORT: CPT | Performed by: INTERNAL MEDICINE

## 2024-12-19 PROCEDURE — 3008F BODY MASS INDEX DOCD: CPT | Performed by: INTERNAL MEDICINE

## 2024-12-19 PROCEDURE — 3079F DIAST BP 80-89 MM HG: CPT | Performed by: INTERNAL MEDICINE

## 2024-12-19 RX ORDER — UBIDECARENONE 30 MG
30 CAPSULE ORAL DAILY
COMMUNITY

## 2024-12-19 NOTE — PROGRESS NOTES
"Chief Complaint:   No chief complaint on file.     History Of Present Illness:    Sylvia Ramos is a 74 y.o. female with a h/o diabetes, dyslipidemia, hypothyroidism, chronic venous insufficiency, and atypical chest pain being evaluated for routine follow-up.     Was diagnosed with H. pylori.  During the treatment her blood pressures were elevated.  It was 1 night that she had elevated blood pressures and this concerned her.  The blood pressure continued to rise and she went to the hospital.  Even without any treatment her blood pressure improved while being in the hospital.    Her blood pressure remained elevated during treatment.  She noticed that her sodium level on blood work was low so she decided to increase her dietary sodium.  During that time her blood pressure remained elevated in the 140s.  She also noticed some more leg swelling.  Since going back to her normal diet her blood pressure has been better and her leg swelling is improved.    She still wears compression socks.     Treadmill stress echo 9/27/19: No evidence of ischemia. EF increased from 60% to 70-75% post exercise.      Allergies:  Sulfamethoxazole-trimethoprim, Adhesive, Adhesive tape-silicones, and Fluticasone propion-salmeterol    Outpatient Medications:  Current Outpatient Medications   Medication Instructions    cholecalciferol (Vitamin D-3) 25 MCG (1000 UT) capsule 1 capsule, 2 times daily    co-enzyme Q-10 30 mg, Daily    ferrous sulfate (325 mg ferrous sulfate) 325 mg, Daily with breakfast    levothyroxine (SYNTHROID, LEVOXYL) 50 mcg, oral, Daily    Livalo 2 mg, oral, Daily    metFORMIN (GLUCOPHAGE) 1,000 mg, oral, 2 times daily       Last Recorded Vitals:  Visit Vitals  /80 (BP Location: Left arm, Patient Position: Sitting)   Pulse 88   Ht 1.626 m (5' 4\")   Wt 59.9 kg (132 lb)   SpO2 98%   BMI 22.66 kg/m²   OB Status Unknown   Smoking Status Never   BSA 1.64 m²      LASTWT(3):   Wt Readings from Last 3 Encounters:   12/19/24 59.9 " kg (132 lb)   12/04/24 62.1 kg (137 lb)   11/20/24 61.7 kg (136 lb)       Physical Exam:  In general: alert and in no acute distress.   HEENT: Carotid upstrokes normal with no bruits. JVP is normal.   Pulmonary: Clear to auscultation bilaterally.  Cardiovascular: S1, S2, regular. No appreciable murmurs, rubs or gallops.   Lower extremities: Warm. 2+ distal pulses. No edema.     Last Labs:  CBC -  Recent Labs     11/20/24  1311 04/09/24  1028 12/22/23  1638   WBC 6.9 5.5 7.2   HGB 12.3 12.0 11.8*   HCT 38.8 38.3 39.4    270 287   MCV 80 80 83       CMP -  Recent Labs     12/06/24  1708 11/20/24  1311 04/09/24  1028   * 128* 136   K 3.9 4.4 4.4    93* 100   CO2 27 27 30   ANIONGAP 10 12 10   BUN 17 10 14   CREATININE 0.80 0.78 0.81   EGFR 77 80 77   MG  --  1.69  --      Recent Labs     12/06/24  1708 11/20/24  1311 04/09/24  1028   ALBUMIN 4.1 4.2 4.1   ALKPHOS 48 44 54   ALT 14 23 16   AST 19 44* 21   BILITOT 0.3 0.6 0.5       LIPID PANEL -   Recent Labs     04/09/24  1028 04/27/23  1026 04/27/23  1024 04/25/22  1038   CHOL 161 151 160 158   LDLCALC 80  --   --   --    LDLF  --  73 80 79   HDL 66.0 63.0 64.1 62.0   TRIG 77 75 78 87       Recent Labs     12/06/24  1708 04/09/24  1028 10/30/23  1107   HGBA1C 6.6* 6.9* 6.8*           Assessment/Plan   1) atypical chest pain: Has not recurred.  No further workup needed at this time.    2) dyslipidemia: LDL well-controlled by most recent lipid panel.  Recommend continuing Livalo 2 mg daily.    3) elevated blood pressure without hypertension: Believe that her blood pressure is more likely secondary to dietary changes and anxiety at the time.  Blood pressures have been better controlled recently.  No changes needed.    4) follow-up: 1 year or sooner if needed.      Nito Ibrahim MD

## 2025-01-25 ENCOUNTER — LAB (OUTPATIENT)
Dept: LAB | Facility: LAB | Age: 75
End: 2025-01-25
Payer: MEDICARE

## 2025-01-25 ENCOUNTER — CLINICAL SUPPORT (OUTPATIENT)
Dept: URGENT CARE | Age: 75
End: 2025-01-25
Payer: MEDICARE

## 2025-01-25 DIAGNOSIS — E03.9 HYPOTHYROIDISM, UNSPECIFIED TYPE: ICD-10-CM

## 2025-01-25 DIAGNOSIS — Z53.21 PATIENT LEFT WITHOUT BEING SEEN: ICD-10-CM

## 2025-01-25 LAB
T4 FREE SERPL-MCNC: 1.4 NG/DL (ref 0.78–1.48)
TSH SERPL-ACNC: 1.93 MIU/L (ref 0.44–3.98)

## 2025-01-25 PROCEDURE — 84443 ASSAY THYROID STIM HORMONE: CPT

## 2025-01-25 PROCEDURE — 84439 ASSAY OF FREE THYROXINE: CPT

## 2025-01-28 LAB — UREA BREATH TEST QL: NOT DETECTED

## 2025-01-31 NOTE — PROGRESS NOTES
Subjective   Patient ID: Sylvia Ramos is a 74 y.o. female. They present today with a chief complaint of Left Without Being Seen.    History of Present Illness  HPI    Past Medical History  Allergies as of 01/25/2025 - Reviewed 01/25/2025   Allergen Reaction Noted    Sulfamethoxazole-trimethoprim Diarrhea and Rash 09/12/2023    Adhesive Itching 07/17/2023    Adhesive tape-silicones Unknown 04/26/2023    Fluticasone propion-salmeterol Unknown 09/12/2023       (Not in a hospital admission)       Past Medical History:   Diagnosis Date    Acute pharyngitis, unspecified 10/15/2015    Acute infective pharyngitis    Encounter for gynecological examination (general) (routine) without abnormal findings 02/14/2018    Visit for gynecologic examination    Encounter for other screening for malignant neoplasm of breast 02/14/2018    Encounter for other screening for malignant neoplasm of breast    Encounter for screening for malignant neoplasm of cervix 02/14/2018    Cervical cancer screening    Encounter for screening for other viral diseases 01/03/2018    Need for hepatitis C screening test    Low back pain, unspecified 10/04/2016    Acute right-sided low back pain without sciatica    Other conditions influencing health status     Mammogram    Other conditions influencing health status     Reported Positive Pap Smear    Other conditions influencing health status     Colonoscopy (Fiberoptic)    Personal history of other diseases of the respiratory system 10/15/2015    History of acute pharyngitis    Personal history of other endocrine, nutritional and metabolic disease     History of hypothyroidism    Personal history of other endocrine, nutritional and metabolic disease     History of hyperlipidemia    Personal history of other endocrine, nutritional and metabolic disease     History of diabetes mellitus    Personal history of other infectious and parasitic diseases     History of herpes zoster    Personal history of other  medical treatment 02/14/2018    History of screening mammography    Rash and other nonspecific skin eruption 10/04/2016    Rash of body    Urinary tract infection, site not specified 07/14/2015    Acute UTI    Urinary tract infection, site not specified 08/23/2018    Acute UTI       Past Surgical History:   Procedure Laterality Date    OTHER SURGICAL HISTORY  10/07/2014    Prior Surgical Procedure Not Done        reports that she has never smoked. She has never used smokeless tobacco. She reports current alcohol use. She reports that she does not use drugs.    Review of Systems  Review of Systems                               Objective    There were no vitals filed for this visit.  No LMP recorded.    Physical Exam    Procedures    Point of Care Test & Imaging Results from this visit  No results found for this visit on 01/25/25.   No results found.    Diagnostic study results (if any) were reviewed by Shayy Otero MD.    Assessment/Plan   Allergies, medications, history, and pertinent labs/EKGs/Imaging reviewed by Shayy Otero MD.     Medical Decision Making      Orders and Diagnoses  Diagnoses and all orders for this visit:  Patient left without being seen      Medical Admin Record      Patient disposition: Physician's Office    Electronically signed by Shayy Otero MD  9:49 AM

## 2025-03-12 ENCOUNTER — TELEPHONE (OUTPATIENT)
Dept: PRIMARY CARE | Facility: CLINIC | Age: 75
End: 2025-03-12
Payer: MEDICARE

## 2025-05-15 ENCOUNTER — APPOINTMENT (OUTPATIENT)
Dept: PRIMARY CARE | Facility: CLINIC | Age: 75
End: 2025-05-15
Payer: MEDICARE

## 2025-05-15 VITALS
DIASTOLIC BLOOD PRESSURE: 80 MMHG | BODY MASS INDEX: 23.22 KG/M2 | HEIGHT: 64 IN | OXYGEN SATURATION: 100 % | HEART RATE: 62 BPM | SYSTOLIC BLOOD PRESSURE: 126 MMHG | WEIGHT: 136 LBS | RESPIRATION RATE: 14 BRPM

## 2025-05-15 DIAGNOSIS — K21.9 GASTROESOPHAGEAL REFLUX DISEASE WITHOUT ESOPHAGITIS: ICD-10-CM

## 2025-05-15 DIAGNOSIS — D50.0 IRON DEFICIENCY ANEMIA DUE TO CHRONIC BLOOD LOSS: ICD-10-CM

## 2025-05-15 DIAGNOSIS — E11.9 CONTROLLED TYPE 2 DIABETES MELLITUS WITHOUT COMPLICATION, WITHOUT LONG-TERM CURRENT USE OF INSULIN: ICD-10-CM

## 2025-05-15 DIAGNOSIS — Z00.00 MEDICARE ANNUAL WELLNESS VISIT, SUBSEQUENT: Primary | ICD-10-CM

## 2025-05-15 DIAGNOSIS — E55.9 MILD VITAMIN D DEFICIENCY: ICD-10-CM

## 2025-05-15 DIAGNOSIS — E78.5 HYPERLIPIDEMIA, UNSPECIFIED HYPERLIPIDEMIA TYPE: ICD-10-CM

## 2025-05-15 DIAGNOSIS — E03.9 ACQUIRED HYPOTHYROIDISM: ICD-10-CM

## 2025-05-15 DIAGNOSIS — Z12.31 ENCOUNTER FOR SCREENING MAMMOGRAM FOR BREAST CANCER: ICD-10-CM

## 2025-05-15 DIAGNOSIS — E11.9 TYPE 2 DIABETES MELLITUS WITHOUT COMPLICATION, WITHOUT LONG-TERM CURRENT USE OF INSULIN: ICD-10-CM

## 2025-05-15 DIAGNOSIS — Z00.00 PREVENTATIVE HEALTH CARE: ICD-10-CM

## 2025-05-15 DIAGNOSIS — E78.5 DYSLIPIDEMIA: ICD-10-CM

## 2025-05-15 DIAGNOSIS — E03.9 HYPOTHYROIDISM, UNSPECIFIED TYPE: ICD-10-CM

## 2025-05-15 LAB — POC HEMOGLOBIN A1C: 6.3 % (ref 4.2–6.5)

## 2025-05-15 PROCEDURE — 1159F MED LIST DOCD IN RCRD: CPT | Performed by: INTERNAL MEDICINE

## 2025-05-15 PROCEDURE — 3074F SYST BP LT 130 MM HG: CPT | Performed by: INTERNAL MEDICINE

## 2025-05-15 PROCEDURE — 1170F FXNL STATUS ASSESSED: CPT | Performed by: INTERNAL MEDICINE

## 2025-05-15 PROCEDURE — 3044F HG A1C LEVEL LT 7.0%: CPT | Performed by: INTERNAL MEDICINE

## 2025-05-15 PROCEDURE — 83036 HEMOGLOBIN GLYCOSYLATED A1C: CPT | Performed by: INTERNAL MEDICINE

## 2025-05-15 PROCEDURE — 1124F ACP DISCUSS-NO DSCNMKR DOCD: CPT | Performed by: INTERNAL MEDICINE

## 2025-05-15 PROCEDURE — 3079F DIAST BP 80-89 MM HG: CPT | Performed by: INTERNAL MEDICINE

## 2025-05-15 PROCEDURE — G0439 PPPS, SUBSEQ VISIT: HCPCS | Performed by: INTERNAL MEDICINE

## 2025-05-15 RX ORDER — BISMUTH SUBSALICYLATE 262 MG
1 TABLET,CHEWABLE ORAL DAILY
COMMUNITY

## 2025-05-15 RX ORDER — PRAVASTATIN SODIUM 20 MG/1
20 TABLET ORAL DAILY
Qty: 90 TABLET | Refills: 3 | Status: SHIPPED | OUTPATIENT
Start: 2025-05-15 | End: 2026-05-15

## 2025-05-15 RX ORDER — METFORMIN HYDROCHLORIDE 500 MG/1
500 TABLET ORAL 2 TIMES DAILY
Qty: 90 TABLET | Refills: 3 | Status: SHIPPED | OUTPATIENT
Start: 2025-05-15

## 2025-05-15 RX ORDER — PANTOPRAZOLE SODIUM 20 MG/1
20 TABLET, DELAYED RELEASE ORAL DAILY PRN
Qty: 30 TABLET | Refills: 3 | Status: SHIPPED | OUTPATIENT
Start: 2025-05-15

## 2025-05-15 ASSESSMENT — ACTIVITIES OF DAILY LIVING (ADL)
MANAGING_FINANCES: INDEPENDENT
DOING_HOUSEWORK: INDEPENDENT
BATHING: INDEPENDENT
DRESSING: INDEPENDENT
TAKING_MEDICATION: INDEPENDENT
GROCERY_SHOPPING: INDEPENDENT

## 2025-05-15 NOTE — PATIENT INSTRUCTIONS
FASTING LABS ARE ORDERED FOR YOU    2.  MAMMOGRAM ORDERED FOR WHEN YOU ARE ABLE    3.  RECOMMEND TETANUS/PERTUSSIS BOOSTER IMMUNIZATION, AND CONSIDER SHINGRIX IMMUNIZATION - SCRIPT PRINTED OUT FOR YOU TO GET AT LOCAL PHARMACY     4.  ONGOING REGULAR EYE EXAMS.  DIET/EXERCISE/WEIGHT MANAGEMENT AS YOU ARE DOING    5.  A1C TODAY = 6.3% = EXCELLENT DIABETIC CONTROL    6.  YOU ARE OTHERWISE CAUGHT UP FROM A HEALTH SCREENING STANDPOINT FOR MEDICARE    7.  FOLLOW UP 6 MONTHS OR AS NEEDED    8.  CHANGE LIVALO TO PRAVASTATIN 20 MG DAILY    9.  DECREASE METFORMIN  MG TWICE DAILY    10.  TRIAL PANTOPRAZOLE ACID BLOCKER ONCE DAILY AS NEEDED FOR HEARTBURN

## 2025-05-15 NOTE — PROGRESS NOTES
Subjective   Reason for Visit: Sylvia Ramos is an 75 y.o. female here for a Medicare Wellness visit.               HPI  A1C 6.3% TODAY    Glucoses since H PYLORII TREATMENT HAVE BEEN GOOD    DID ASSESSMENT ON COMPUTER, AND SAID PRE-DIABETIC     HAD SHINGRIX AND GOT SIDE EFFECTS    HAS HAD 2 MILD CASES OF SHINGLES IN THE PAST      Patient Care Team:  Miguel Bruce MD as PCP - General (Internal Medicine)  Miguel Bruce MD as PCP [place] [facility]     Review of Systems    Objective   Vitals:  There were no vitals taken for this visit.      Physical Exam    Assessment & Plan  Type 2 diabetes mellitus without complication, without long-term current use of insulin    Orders:    POCT glycosylated hemoglobin (Hb A1C) manually resulted    Lipid Panel; Future    Preventative health care    Orders:    zoster vaccine-recombinant adjuvanted (Shingrix) 50 mcg/0.5 mL vaccine; Inject 0.5 mL into the muscle 1 time for 1 dose.    Encounter for screening mammogram for breast cancer    Orders:    BI mammo bilateral screening tomosynthesis; Future    Dyslipidemia    Orders:    Vitamin B12; Future    Lipid Panel; Future    Comprehensive Metabolic Panel; Future    Hypothyroidism, unspecified type    Orders:    TSH with reflex to Free T4 if abnormal; Future    Mild vitamin D deficiency    Orders:    Vitamin D 25-Hydroxy,Total (for eval of Vitamin D levels); Future    Iron deficiency anemia due to chronic blood loss    Orders:    CBC; Future    Ferritin; Future    Lipid Panel; Future    Hyperlipidemia, unspecified hyperlipidemia type    Orders:    Lipid Panel; Future    pravastatin (Pravachol) 20 mg tablet; Take 1 tablet (20 mg) by mouth once daily. TAKE PLACE OF LIVALO    Controlled type 2 diabetes mellitus without complication, without long-term current use of insulin    Orders:    metFORMIN (Glucophage) 500 mg tablet; Take 1 tablet (500 mg) by mouth 2 times a day.    Acquired  hypothyroidism    Orders:    Lipid Panel; Future    Gastroesophageal reflux disease without esophagitis              {AWV Counseling (Optional):12762}      MONTHS OR AS NEEDED    8.  CHANGE LIVALO TO PRAVASTATIN 20 MG DAILY    9.  DECREASE METFORMIN  MG TWICE DAILY    10.  TRIAL PANTOPRAZOLE ACID BLOCKER ONCE DAILY AS NEEDED FOR HEARTBURN

## 2025-05-15 NOTE — ASSESSMENT & PLAN NOTE
Orders:    Vitamin B12; Future    Lipid Panel; Future    Comprehensive Metabolic Panel; Future

## 2025-05-16 LAB
25(OH)D3+25(OH)D2 SERPL-MCNC: 30 NG/ML (ref 30–100)
ALBUMIN SERPL-MCNC: 4.2 G/DL (ref 3.6–5.1)
ALP SERPL-CCNC: 56 U/L (ref 37–153)
ALT SERPL-CCNC: 14 U/L (ref 6–29)
ANION GAP SERPL CALCULATED.4IONS-SCNC: 7 MMOL/L (CALC) (ref 7–17)
AST SERPL-CCNC: 19 U/L (ref 10–35)
BILIRUB SERPL-MCNC: 0.5 MG/DL (ref 0.2–1.2)
BUN SERPL-MCNC: 15 MG/DL (ref 7–25)
CALCIUM SERPL-MCNC: 8.9 MG/DL (ref 8.6–10.4)
CHLORIDE SERPL-SCNC: 101 MMOL/L (ref 98–110)
CHOLEST SERPL-MCNC: 165 MG/DL
CHOLEST/HDLC SERPL: 2.1 (CALC)
CO2 SERPL-SCNC: 30 MMOL/L (ref 20–32)
CREAT SERPL-MCNC: 0.78 MG/DL (ref 0.6–1)
EGFRCR SERPLBLD CKD-EPI 2021: 79 ML/MIN/1.73M2
ERYTHROCYTE [DISTWIDTH] IN BLOOD BY AUTOMATED COUNT: 12.7 % (ref 11–15)
FERRITIN SERPL-MCNC: 12 NG/ML (ref 16–288)
GLUCOSE SERPL-MCNC: 113 MG/DL (ref 65–99)
HCT VFR BLD AUTO: 40.4 % (ref 35–45)
HDLC SERPL-MCNC: 78 MG/DL
HGB BLD-MCNC: 12.4 G/DL (ref 11.7–15.5)
LDLC SERPL CALC-MCNC: 72 MG/DL (CALC)
MCH RBC QN AUTO: 26 PG (ref 27–33)
MCHC RBC AUTO-ENTMCNC: 30.7 G/DL (ref 32–36)
MCV RBC AUTO: 84.7 FL (ref 80–100)
NONHDLC SERPL-MCNC: 87 MG/DL (CALC)
PLATELET # BLD AUTO: 253 THOUSAND/UL (ref 140–400)
PMV BLD REES-ECKER: 10.4 FL (ref 7.5–12.5)
POTASSIUM SERPL-SCNC: 4.9 MMOL/L (ref 3.5–5.3)
PROT SERPL-MCNC: 7.1 G/DL (ref 6.1–8.1)
RBC # BLD AUTO: 4.77 MILLION/UL (ref 3.8–5.1)
SODIUM SERPL-SCNC: 138 MMOL/L (ref 135–146)
TRIGL SERPL-MCNC: 70 MG/DL
TSH SERPL-ACNC: 2.04 MIU/L (ref 0.4–4.5)
VIT B12 SERPL-MCNC: 532 PG/ML (ref 200–1100)
WBC # BLD AUTO: 5.9 THOUSAND/UL (ref 3.8–10.8)

## 2025-06-01 ASSESSMENT — ENCOUNTER SYMPTOMS
VOMITING: 0
DIARRHEA: 0
CHILLS: 0
NAUSEA: 0
MYALGIAS: 0
DIAPHORESIS: 0
JOINT SWELLING: 0
WEAKNESS: 0
FEVER: 0
SHORTNESS OF BREATH: 0
COUGH: 0
FATIGUE: 0
CONSTIPATION: 0

## 2025-11-13 ENCOUNTER — APPOINTMENT (OUTPATIENT)
Dept: PRIMARY CARE | Facility: CLINIC | Age: 75
End: 2025-11-13
Payer: MEDICARE

## 2025-12-19 ENCOUNTER — APPOINTMENT (OUTPATIENT)
Dept: CARDIOLOGY | Facility: CLINIC | Age: 75
End: 2025-12-19
Payer: MEDICARE

## 2026-05-14 ENCOUNTER — APPOINTMENT (OUTPATIENT)
Dept: PRIMARY CARE | Facility: CLINIC | Age: 76
End: 2026-05-14
Payer: MEDICARE